# Patient Record
Sex: FEMALE | Race: WHITE | NOT HISPANIC OR LATINO | Employment: UNEMPLOYED | URBAN - METROPOLITAN AREA
[De-identification: names, ages, dates, MRNs, and addresses within clinical notes are randomized per-mention and may not be internally consistent; named-entity substitution may affect disease eponyms.]

---

## 2017-01-16 ENCOUNTER — HOSPITAL ENCOUNTER (EMERGENCY)
Facility: HOSPITAL | Age: 57
Discharge: HOME | End: 2017-01-16
Attending: SURGERY

## 2017-01-16 VITALS
HEART RATE: 101 BPM | WEIGHT: 129 LBS | TEMPERATURE: 100 F | DIASTOLIC BLOOD PRESSURE: 72 MMHG | RESPIRATION RATE: 18 BRPM | SYSTOLIC BLOOD PRESSURE: 134 MMHG | OXYGEN SATURATION: 100 %

## 2017-01-16 DIAGNOSIS — J44.1 CHRONIC OBSTRUCTIVE PULMONARY DISEASE WITH ACUTE EXACERBATION: Primary | ICD-10-CM

## 2017-01-16 LAB
ALBUMIN SERPL BCP-MCNC: 3.9 G/DL
ALP SERPL-CCNC: 97 U/L
ALT SERPL W/O P-5'-P-CCNC: 14 U/L
ANION GAP SERPL CALC-SCNC: 11 MMOL/L
AST SERPL-CCNC: 15 U/L
BASOPHILS # BLD AUTO: 0.07 K/UL
BASOPHILS NFR BLD: 0.7 %
BILIRUB SERPL-MCNC: 0.7 MG/DL
BNP SERPL-MCNC: 44 PG/ML
BUN SERPL-MCNC: 9 MG/DL
CALCIUM SERPL-MCNC: 9 MG/DL
CHLORIDE SERPL-SCNC: 104 MMOL/L
CK MB SERPL-MCNC: 1.4 NG/ML
CK MB SERPL-RTO: 2.8 %
CK SERPL-CCNC: 50 U/L
CK SERPL-CCNC: 50 U/L
CO2 SERPL-SCNC: 26 MMOL/L
CREAT SERPL-MCNC: 0.8 MG/DL
DIFFERENTIAL METHOD: NORMAL
EOSINOPHIL # BLD AUTO: 0.3 K/UL
EOSINOPHIL NFR BLD: 2.8 %
ERYTHROCYTE [DISTWIDTH] IN BLOOD BY AUTOMATED COUNT: 13.2 %
EST. GFR  (AFRICAN AMERICAN): >60 ML/MIN/1.73 M^2
EST. GFR  (NON AFRICAN AMERICAN): >60 ML/MIN/1.73 M^2
GLUCOSE SERPL-MCNC: 146 MG/DL
HCT VFR BLD AUTO: 43 %
HGB BLD-MCNC: 13.8 G/DL
LACTATE SERPL-SCNC: 1.3 MMOL/L
LYMPHOCYTES # BLD AUTO: 3.3 K/UL
LYMPHOCYTES NFR BLD: 31.7 %
MCH RBC QN AUTO: 30.5 PG
MCHC RBC AUTO-ENTMCNC: 32.1 %
MCV RBC AUTO: 95 FL
MONOCYTES # BLD AUTO: 0.9 K/UL
MONOCYTES NFR BLD: 8.9 %
NEUTROPHILS # BLD AUTO: 5.8 K/UL
NEUTROPHILS NFR BLD: 55.9 %
PLATELET # BLD AUTO: 219 K/UL
PMV BLD AUTO: 11.4 FL
POTASSIUM SERPL-SCNC: 4.3 MMOL/L
PROT SERPL-MCNC: 7.5 G/DL
RBC # BLD AUTO: 4.53 M/UL
SODIUM SERPL-SCNC: 141 MMOL/L
TROPONIN I SERPL DL<=0.01 NG/ML-MCNC: <0.006 NG/ML
WBC # BLD AUTO: 10.32 K/UL

## 2017-01-16 PROCEDURE — 83605 ASSAY OF LACTIC ACID: CPT

## 2017-01-16 PROCEDURE — 94640 AIRWAY INHALATION TREATMENT: CPT

## 2017-01-16 PROCEDURE — 87040 BLOOD CULTURE FOR BACTERIA: CPT

## 2017-01-16 PROCEDURE — 96374 THER/PROPH/DIAG INJ IV PUSH: CPT

## 2017-01-16 PROCEDURE — 84484 ASSAY OF TROPONIN QUANT: CPT

## 2017-01-16 PROCEDURE — 99284 EMERGENCY DEPT VISIT MOD MDM: CPT | Mod: 25

## 2017-01-16 PROCEDURE — 82553 CREATINE MB FRACTION: CPT

## 2017-01-16 PROCEDURE — 85025 COMPLETE CBC W/AUTO DIFF WBC: CPT

## 2017-01-16 PROCEDURE — 25000242 PHARM REV CODE 250 ALT 637 W/ HCPCS: Performed by: SURGERY

## 2017-01-16 PROCEDURE — 94644 CONT INHLJ TX 1ST HOUR: CPT

## 2017-01-16 PROCEDURE — 83880 ASSAY OF NATRIURETIC PEPTIDE: CPT

## 2017-01-16 PROCEDURE — 25000003 PHARM REV CODE 250: Performed by: SURGERY

## 2017-01-16 PROCEDURE — 80053 COMPREHEN METABOLIC PANEL: CPT

## 2017-01-16 PROCEDURE — 63600175 PHARM REV CODE 636 W HCPCS: Performed by: SURGERY

## 2017-01-16 RX ORDER — NITROGLYCERIN 0.4 MG/1
0.4 TABLET SUBLINGUAL EVERY 5 MIN PRN
COMMUNITY
End: 2019-12-26 | Stop reason: CLARIF

## 2017-01-16 RX ORDER — LEVALBUTEROL INHALATION SOLUTION 1.25 MG/3ML
1.25 SOLUTION RESPIRATORY (INHALATION)
Status: DISCONTINUED | OUTPATIENT
Start: 2017-01-16 | End: 2017-01-16 | Stop reason: HOSPADM

## 2017-01-16 RX ORDER — ALBUTEROL SULFATE 0.83 MG/ML
2.5 SOLUTION RESPIRATORY (INHALATION) EVERY 6 HOURS PRN
Qty: 1 BOX | Refills: 0 | Status: ON HOLD | OUTPATIENT
Start: 2017-01-16 | End: 2019-12-29 | Stop reason: HOSPADM

## 2017-01-16 RX ORDER — ALBUTEROL SULFATE 1.25 MG/3ML
1.25 SOLUTION RESPIRATORY (INHALATION) EVERY 6 HOURS PRN
COMMUNITY
End: 2017-01-16

## 2017-01-16 RX ORDER — ALBUTEROL SULFATE 2.5 MG/.5ML
10 SOLUTION RESPIRATORY (INHALATION) CONTINUOUS
Status: DISCONTINUED | OUTPATIENT
Start: 2017-01-16 | End: 2017-01-16 | Stop reason: HOSPADM

## 2017-01-16 RX ORDER — METHYLPREDNISOLONE 4 MG/1
TABLET ORAL
Qty: 1 PACKAGE | Refills: 0 | Status: SHIPPED | OUTPATIENT
Start: 2017-01-16 | End: 2018-07-12

## 2017-01-16 RX ORDER — ALBUTEROL SULFATE 90 UG/1
1-2 AEROSOL, METERED RESPIRATORY (INHALATION) EVERY 6 HOURS PRN
Qty: 1 INHALER | Refills: 0 | Status: SHIPPED | OUTPATIENT
Start: 2017-01-16 | End: 2018-01-16

## 2017-01-16 RX ORDER — BUDESONIDE AND FORMOTEROL FUMARATE DIHYDRATE 160; 4.5 UG/1; UG/1
2 AEROSOL RESPIRATORY (INHALATION) EVERY 12 HOURS
COMMUNITY
End: 2019-12-26 | Stop reason: CLARIF

## 2017-01-16 RX ORDER — LEVALBUTEROL 1.25 MG/.5ML
1.25 SOLUTION, CONCENTRATE RESPIRATORY (INHALATION)
Status: DISCONTINUED | OUTPATIENT
Start: 2017-01-16 | End: 2017-01-16 | Stop reason: HOSPADM

## 2017-01-16 RX ORDER — AZITHROMYCIN 250 MG/1
TABLET, FILM COATED ORAL
Qty: 6 TABLET | Refills: 0 | Status: SHIPPED | OUTPATIENT
Start: 2017-01-16 | End: 2018-07-12

## 2017-01-16 RX ORDER — LEVALBUTEROL 1.25 MG/.5ML
1.25 SOLUTION, CONCENTRATE RESPIRATORY (INHALATION)
Status: COMPLETED | OUTPATIENT
Start: 2017-01-16 | End: 2017-01-16

## 2017-01-16 RX ORDER — ALPRAZOLAM 0.5 MG/1
0.25 TABLET ORAL DAILY PRN
COMMUNITY

## 2017-01-16 RX ADMIN — METHYLPREDNISOLONE SODIUM SUCCINATE 125 MG: 125 INJECTION, POWDER, FOR SOLUTION INTRAMUSCULAR; INTRAVENOUS at 02:01

## 2017-01-16 RX ADMIN — LEVALBUTEROL 1.25 MG: 1.25 SOLUTION, CONCENTRATE RESPIRATORY (INHALATION) at 01:01

## 2017-01-16 RX ADMIN — ALBUTEROL SULFATE 10 MG: 2.5 SOLUTION RESPIRATORY (INHALATION) at 02:01

## 2017-01-16 NOTE — ED AVS SNAPSHOT
[facility]  [address] [facility] [address] [address]               Odessa Cm   2017  1:05 PM   ED    Description:  Female : 1960   Department:  [facility]           Your Care was Coordinated By:     Provider Role From To    Louie Macdonald MD Attending Provider 17 0883 --      Reason for Visit     Shortness of Breath           Diagnoses this Visit        Comments    Chronic obstructive pulmonary disease with acute exacerbation    -  Primary       ED Disposition     ED Disposition Condition Comment    Discharge             To Do List           Follow-up Information     Follow up with Angelica Alarcon MD. Schedule an appointment as soon as possible for a visit in 2 days.    Specialty:  Family Medicine    Contact information:    [address]  [facility] [address]  [phone]         These Medications        Disp Refills Start End    albuterol 90 mcg/actuation inhaler 1 Inhaler 0 2017    Inhale 1-2 puffs into the lungs every 6 (six) hours as needed for Wheezing (sob). - Inhalation    albuterol (PROVENTIL) 2.5 mg /3 mL (0.083 %) nebulizer solution 1 Box 0 2017    Take 3 mLs (2.5 mg total) by nebulization every 6 (six) hours as needed for Wheezing. - Nebulization    methylPREDNISolone (MEDROL DOSEPACK) 4 mg tablet 1 Package 0 2017     Pack as directed    azithromycin (Z-EVA) 250 MG tablet 6 tablet 0 2017     Z-PACK AS DIRECTED      [facility]     [facility] -  Assistance  Registered nurses in the [facility] provide clinical advisement, health education, appointment booking, and other advisory services.  Call for this free service at [phone].             Medications           START taking these NEW medications        Refills    albuterol 90 mcg/actuation inhaler 0    Sig: Inhale 1-2 puffs into the lungs every 6 (six) hours as needed for Wheezing  (sob).    Class: Print    Route: Inhalation    albuterol (PROVENTIL) 2.5 mg /3 mL (0.083 %) nebulizer solution 0    Sig: Take 3 mLs (2.5 mg total) by nebulization every 6 (six) hours as needed for Wheezing.    Class: Print    Route: Nebulization    methylPREDNISolone (MEDROL DOSEPACK) 4 mg tablet 0    Sig: Pack as directed    Class: Print    azithromycin (Z-EVA) 250 MG tablet 0    Sig: Z-PACK AS DIRECTED    Class: Print      These medications were administered today        Dose Freq    levalbuterol nebulizer solution 1.25 mg 1.25 mg ED 1 Time    Sig: Take 0.5 mLs (1.25 mg total) by nebulization ED 1 Time.    Class: Normal    Route: Nebulization    levalbuterol nebulizer solution 1.25 mg 1.25 mg ED 1 Time    Sig: Take 3 mLs (1.25 mg total) by nebulization ED 1 Time.    Class: Normal    Route: Nebulization    levalbuterol nebulizer solution 1.25 mg 1.25 mg ED 1 Time    Sig: Take 0.5 mLs (1.25 mg total) by nebulization ED 1 Time.    Class: Normal    Route: Nebulization    albuterol sulfate nebulizer solution 10 mg 10 mg Continuous    Sig: Take 10 mg by nebulization continuous.    Class: Normal    Route: Nebulization    methylPREDNISolone sod suc(PF) 125 mg/2 mL injection 125 mg 125 mg ED 1 Time    Sig: Inject 125 mg into the vein ED 1 Time.    Class: Normal    Route: Intravenous      STOP taking these medications     albuterol (ACCUNEB) 1.25 mg/3 mL Nebu Take 1.25 mg by nebulization every 6 (six) hours as needed.           Verify that the below list of medications is an accurate representation of the medications you are currently taking.  If none reported, the list may be blank. If incorrect, please contact your healthcare provider. Carry this list with you in case of emergency.           Current Medications     alprazolam (XANAX) 0.5 MG tablet Take 0.25 mg by mouth daily as needed for Anxiety.    budesonide-formoterol 160-4.5 mcg (SYMBICORT) 160-4.5 mcg/actuation HFAA Inhale 2 puffs into the lungs every 12 (twelve)  hours.    albuterol (PROVENTIL) 2.5 mg /3 mL (0.083 %) nebulizer solution Take 3 mLs (2.5 mg total) by nebulization every 6 (six) hours as needed for Wheezing.    albuterol 90 mcg/actuation inhaler Inhale 1-2 puffs into the lungs every 6 (six) hours as needed for Wheezing (sob).    albuterol sulfate nebulizer solution 10 mg Take 10 mg by nebulization continuous.    azithromycin (Z-EVA) 250 MG tablet Z-PACK AS DIRECTED    levalbuterol nebulizer solution 1.25 mg Take 3 mLs (1.25 mg total) by nebulization ED 1 Time.    levalbuterol nebulizer solution 1.25 mg Take 0.5 mLs (1.25 mg total) by nebulization ED 1 Time.    methylPREDNISolone (MEDROL DOSEPACK) 4 mg tablet Pack as directed    nitroGLYCERIN (NITROSTAT) 0.4 MG SL tablet Place 0.4 mg under the tongue every 5 (five) minutes as needed for Chest pain.           Clinical Reference Information           Your Vitals Were     BP Pulse Temp Resp Weight SpO2    130/61 109 99.6 °F (37.6 °C) (Oral) 20 58.5 kg (129 lb) 100%      Allergies as of 1/16/2017        Reactions    Codeine       Immunizations Administered on Date of Encounter - 1/16/2017     None      ED Micro, Lab, POCT     Start Ordered       Status Ordering Provider    01/16/17 1308 01/16/17 1307  Comprehensive metabolic panel  STAT      Final result     01/16/17 1308 01/16/17 1307  CBC auto differential  STAT      Final result     01/16/17 1308 01/16/17 1307  Urinalysis  STAT      Acknowledged     01/16/17 1308 01/16/17 1307  Blood culture  Once      In process     01/16/17 1308 01/16/17 1307  Blood culture  Once      In process     01/16/17 1308 01/16/17 1307  Lactic acid, plasma  STAT      Final result     01/16/17 1308 01/16/17 1307  Troponin I  Now then every 6 hours     Start Status   01/16/17 1308 Final result   01/16/17 1908 Scheduled   01/17/17 0108 Scheduled   01/17/17 0708 Scheduled   01/17/17 1308 Scheduled   01/17/17 1908 Scheduled   01/18/17 0108 Scheduled   01/18/17 0708 Scheduled   01/18/17 1308  Scheduled   01/18/17 1908 Scheduled   01/19/17 0108 Scheduled   01/19/17 0708 Scheduled   01/19/17 1308 Scheduled   01/19/17 1908 Scheduled       Acknowledged     01/16/17 1308 01/16/17 1307  CK  STAT      Final result     01/16/17 1308 01/16/17 1307  CK-MB  STAT      Final result     01/16/17 1308 01/16/17 1307  Brain natriuretic peptide  STAT      Final result       ED Imaging Orders     Start Ordered       Status Ordering Provider    01/16/17 1308 01/16/17 1307  X-Ray Chest PA And Lateral  1 time imaging      Final result       Discharge References/Attachments     COPD FLARE (ENGLISH)      [facility] Sign-Up     Activating your [facility] account is as easy as 1-2-3!     1) Visit [email or web address], select Sign Up Now, enter this activation code and your date of birth, then select Next.  [email or web address]  Expires: 3/2/2017  2:22 PM      2) Create a username and password to use when you visit [facility] in the future and select a security question in case you lose your password and select Next.    3) Enter your e-mail address and click Sign Up!    Additional Information  If you have questions, please e-mail [email or web address] or call [phone] to talk to our [facility] staff. Remember, [facility] is NOT to be used for urgent needs. For medical emergencies, dial 911.         Smoking Cessation     If you would like to quit smoking:   You may be eligible for free services if you are a [place] resident and started smoking cigarettes before September 1, 1988.  Call the Smoking Cessation Trust (SCT) toll free at (458) 062-3547 or (808) 828-7875.   Call 6-800-QUIT-NOW if you do not meet the above criteria.             [facility] complies with applicable Federal civil rights laws and does not discriminate on the basis of race, color, national origin, age, disability, or sex.        Language Assistance Services     ATTENTION: Language assistance services are available, free of charge. Please call  [phone].      ATENCIÓN: Si habla español, tiene a hawk disposición servicios gratuitos de asistencia lingüística. Llame al [phone].     CHÚ Ý: N?u b?n nói Ti?ng Vi?t, có các d?ch v? h? tr? ngôn ng? mi?n phí dành cho b?n. G?i s? [phone].

## 2017-01-16 NOTE — ED NOTES
"Patient states, "I'm feeling much better,"  Patient denies SOB.  Discharged to home in stable condition, respirations even and unlabored, NAD.   "

## 2017-01-16 NOTE — ED NOTES
" states, "The patient's IV fell out."  IV noted to be on floor, catheter intact, dressing applied to site.   "

## 2017-01-16 NOTE — ED PROVIDER NOTES
[facility]                                        2017                     Chief Complaint  56 y.o. female with Shortness of Breath    History of Present Illness  [place] Cm presents to the emergency room with shortness of breath and cough  Patient states she was driving along when she had a COPD exacerbation prior to arrival  Patient was a long-time smoker that quit last year, has a long COPD history per daughter  Patient initially arrives with faint crackles but no wheezing, good air exchange on arrival  Patient was 100% oxygenation after first breathing treatment, wears 2 liters at home    Pt denies any sputum production, has a dry hacking cough that has been worse ×3 days  Patient feels much better after breathing treatments in the ER, no longer short of breath     The history is provided by the patient    Past Medical History   -- Anxiety    -- COPD (chronic obstructive pulmonary disease)    -- Coronary artery disease    -- Hypertension      Past Surgical History   -- Coronary angioplasty with stent placement     -- Appendectomy     -- Cholecystectomy     -- Hysterectomy     --  section     -- Carpal tunnel release        ALLERGIES: Codeine     Review of Systems and Physical Exam     Review of Systems  -- Constitution - no fever, denies fatigue, no weakness, no chills  -- Eyes - no tearing or redness, no visual disturbance  -- Ear, Nose - no tinnitus or earache, no nasal congestion or discharge  -- Mouth,Throat - no sore throat, no toothache, normal voice, normal swallowing  -- Respiratory - cough and congestion, shortness of breath, no PORTER  -- Cardiovascular - denies chest pain, no palpitations, denies claudication  -- Gastrointestinal - denies abdominal pain, nausea, vomiting, or diarrhea  -- Musculoskeletal - denies back pain, negative for myalgias and arthralgias   -- Neurological - no headache, denies weakness or seizure; no LOC  -- Skin - denies pallor,  rash, or changes in skin. no hives or welts noted    Vital Signs  -- BP is 130/61 and her pulse is 109. Her respiration is 20 and oxygen saturation is 100%.      Physical Exam  -- Nursing note and vitals reviewed  -- Constitutional: Appears well-developed and well-nourished  -- Head: Atraumatic. Normocephalic. No obvious abnormality  -- Eyes: Pupils are equal and reactive to light. Normal conjunctiva and lids  -- Nose: Nose normal in appearance, nares grossly normal. No discharge  -- Throat: Mucous membranes moist, pharynx normal, normal tonsils. No lesions   -- Ears: External ears and TM normal bilaterally. Normal hearing and no drainage  -- Cardiac: Normal rate, regular rhythm and normal heart sounds  -- Pulmonary: faint rhonchi at the bilateral bases with no active wheezing   -- Abdominal: Soft, no tenderness. Normal bowel sounds. Normal liver edge  -- Musculoskeletal: Normal range of motion, no effusions. Joints stable   -- Neurological: No focal deficits. Showed good interaction with staff    Emergency Room Course     Treatment and Evaluation  -- The EKG findings today were without concerning findings from baseline   -- Chest x-ray showed no infiltrate and showed no acute pathology   -- The electrolytes drawn in the ER today were within normal limits   -- The CBC drawn in the ER today was within normal limits   -- The cardiac enzymes were within normal limits   -- The BNP drawn in the ER today were within normal limits   -- Lactic Acid drawn in the ER today was normal   -- Blood cultures have also been drawn, results are pending     Medications Given  -- levalbuterol nebulizer solution 1.25 mg (1.25 mg Nebulization Not Given 1/16/17 1330)   -- levalbuterol nebulizer solution 1.25 mg (1.25 mg Nebulization Not Given 1/16/17 1330)   -- albuterol sulfate nebulizer solution 10 mg ( Nebulization Canceled Entry 1/16/17 3499)   -- levalbuterol nebulizer solution 1.25 mg (1.25 mg Nebulization Given 1/16/17 1330)   --  methylPREDNISolone sod suc(PF) 125 mg/2 mL injection 125 mg      Diagnosis  -- The encounter diagnosis was Chronic obstructive pulmonary disease with acute exacerbation.    Disposition and Plan  -- Disposition: home  -- Condition: stable  -- Follow-up: Patient to follow up with as MD in 1-2 days.  -- I advised the patient that we have found no life threatening condition today  -- At this time, I believe the patient is clinically stable for discharge.   -- The patient acknowledges that close follow up with a MD is required   -- Patient agrees to comply with all instruction and direction given in the ER    This note is dictated on Dragon Natural Speaking word recognition program.  There are word recognition mistakes that are occasionally missed on review.           Louie Macdonald MD  01/16/17 3087

## 2017-01-22 LAB
BACTERIA BLD CULT: NORMAL
BACTERIA BLD CULT: NORMAL

## 2018-07-12 ENCOUNTER — HOSPITAL ENCOUNTER (EMERGENCY)
Facility: HOSPITAL | Age: 58
Discharge: HOME | End: 2018-07-12
Payer: COMMERCIAL

## 2018-07-12 VITALS
DIASTOLIC BLOOD PRESSURE: 82 MMHG | HEIGHT: 62 IN | RESPIRATION RATE: 20 BRPM | HEART RATE: 98 BPM | OXYGEN SATURATION: 94 % | WEIGHT: 138 LBS | TEMPERATURE: 98 F | SYSTOLIC BLOOD PRESSURE: 149 MMHG | BODY MASS INDEX: 25.4 KG/M2
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
## 2018-07-12 PROBLEM — M81.0 OSTEOPOROSIS: Status: ACTIVE | Noted: 2018-07-12

## 2018-07-12 PROBLEM — J44.9 CHRONIC OBSTRUCTIVE LUNG DISEASE: Status: ACTIVE | Noted: 2018-07-12

## 2018-07-12 PROBLEM — I25.10 CORONARY ATHEROSCLEROSIS: Status: ACTIVE | Noted: 2018-07-12

## 2018-07-12 PROBLEM — E55.9 VITAMIN D DEFICIENCY: Status: ACTIVE | Noted: 2018-07-12

## 2018-07-12 PROBLEM — F41.9 ANXIETY: Status: ACTIVE | Noted: 2018-07-12

## 2018-07-12 PROCEDURE — 99283 EMERGENCY DEPT VISIT LOW MDM: CPT

## 2018-07-12 RX ORDER — TIOTROPIUM BROMIDE 18 UG/1
CAPSULE ORAL; RESPIRATORY (INHALATION)
COMMUNITY
End: 2019-12-26 | Stop reason: CLARIF

## 2018-07-12 RX ORDER — MUPIROCIN 20 MG/G
OINTMENT TOPICAL 3 TIMES DAILY
Qty: 22 G | Refills: 0 | Status: SHIPPED | OUTPATIENT
Start: 2018-07-12 | End: 2019-12-26 | Stop reason: CLARIF

## 2018-07-12 RX ORDER — SULFAMETHOXAZOLE AND TRIMETHOPRIM 800; 160 MG/1; MG/1
1 TABLET ORAL 2 TIMES DAILY
Qty: 14 TABLET | Refills: 0 | Status: SHIPPED | OUTPATIENT
Start: 2018-07-12 | End: 2018-07-19

## 2018-07-12 RX ORDER — ZOLEDRONIC ACID 5 MG/100ML
100 INJECTION, SOLUTION INTRAVENOUS
COMMUNITY
End: 2019-12-26 | Stop reason: CLARIF

## 2018-07-12 RX ORDER — IPRATROPIUM BROMIDE 0.5 MG/2.5ML
SOLUTION RESPIRATORY (INHALATION)
Status: ON HOLD | COMMUNITY
End: 2019-12-29 | Stop reason: HOSPADM

## 2018-07-13 NOTE — ED PROVIDER NOTES
CHIEF COMPLAINT  Chief Complaint   Patient presents with    Insect Bite     right arm       HPI  Od[place] Jj a 57 y.o. female who presents to the ED with complaints of an insect bite to her right arm. Did not see what bit her. Noticed it when she awakened today. Has not put any topical medication or taken anything for the problem.       CURRENT MEDICATIONS  No current facility-administered medications on file prior to encounter.      Current Outpatient Prescriptions on File Prior to Encounter   Medication Sig Dispense Refill    albuterol (PROVENTIL) 2.5 mg /3 mL (0.083 %) nebulizer solution Take 3 mLs (2.5 mg total) by nebulization every 6 (six) hours as needed for Wheezing. 1 Box 0    alprazolam (XANAX) 0.5 MG tablet Take 0.25 mg by mouth daily as needed for Anxiety.      budesonide-formoterol 160-4.5 mcg (SYMBICORT) 160-4.5 mcg/actuation HFAA Inhale 2 puffs into the lungs every 12 (twelve) hours.      nitroGLYCERIN (NITROSTAT) 0.4 MG SL tablet Place 0.4 mg under the tongue every 5 (five) minutes as needed for Chest pain.      [DISCONTINUED] azithromycin (Z-EVA) 250 MG tablet Z-PACK AS DIRECTED 6 tablet 0    [DISCONTINUED] methylPREDNISolone (MEDROL DOSEPACK) 4 mg tablet Pack as directed 1 Package 0       ALLERGIES  Review of patient's allergies indicates:   Allergen Reactions    Codeine     Pcn [penicillins]          There is no immunization history on file for this patient.    PAST MEDICAL HISTORY  Past Medical History:   Diagnosis Date    Anxiety     COPD (chronic obstructive pulmonary disease)     Coronary artery disease     Hypertension        SURGICAL HISTORY  Past Surgical History:   Procedure Laterality Date    APPENDECTOMY      CARPAL TUNNEL RELEASE Left      SECTION      CHOLECYSTECTOMY      CORONARY ANGIOPLASTY WITH STENT PLACEMENT      HYSTERECTOMY         SOCIAL HISTORY  Social History     Social History    Marital status: Single     Spouse name: N/A    Number of  "children: N/A    Years of education: N/A     Occupational History    Not on file.     Social History Main Topics    Smoking status: Former Smoker    Smokeless tobacco: Not on file    Alcohol use No    Drug use: No    Sexual activity: No     Other Topics Concern    Not on file     Social History Narrative    No narrative on file       FAMILY HISTORY  History reviewed. No pertinent family history.    REVIEW OF SYSTEMS  Constitutional: No fever, chills, or weakness.  Eyes: No redness, pain, or discharge  HENT: No ear pain, no headache, no rhinorrhea, no throat pain  Respiratory: No cough, wheezing or shortness of breath  Cardiovascular: No chest pain, palpitations or edema  GI: No abdominal pain, nausea, vomiting or diarrhea  Gu: No dysuria, no hematuria, or discharge  Musculoskeletal: No pain, full range of motion. Good sensation  Skin: Insect bite right arm  Neurologic: No focal weakness or sensory changes.  All systems otherwise negative except as noted in the Review of Systems and History of Present Illness      PHYSICAL EXAM  Reviewed Triage Note  VITAL SIGNS:   Patient Vitals for the past 24 hrs:   BP Temp Temp src Pulse Resp SpO2 Height Weight   07/12/18 1911 (!) 149/82 98.3 °F (36.8 °C) Oral 98 20 (!) 94 % 5' 2" (1.575 m) 62.6 kg (138 lb)     Constitutional: Well developed, well nourished, Alert and oriented x3, No acute distress, non-toxic appearance.  HENT: Normocephalic, Atraumatic, Bilateral external ears normal, external nose negative, oropharynx moist, No oral exudates.  Eyes: PERRL, EOMI, Conjunctiva normal, No discharge.  Neck: Normal range of motion, no tenderness, supple, no carotid bruits  Respiratory: Normal breath sounds, no respiratory distress, no wheezing, no rhonchi, no rales  Cardiovascular: Normal heart rate, normal rhythm, no murmurs, no rubs, no gallops.  Gi: Bowel sounds normal, soft, no tenderness, non-distended, no masses, no pulsatile masses.  Musculoskeletal: No edema, no " tenderness, no cyanosis, no clubbing. Good range of motion in all major joints. No tenderness to palpation or major deformities noted.   Integument: red, inflamed area right distal upper arm near proximal to elbow. Not tender to touch. Area warm to touch. Scabbed center.   Neurologic: Normal motor function, normal sensory function. No focal deficits noted. Intact distal pulses  Psychiatric: Affect normal, judgment normal, mood normal      LABS  Pertinent labs reviewed. (see chart for details)  Labs Reviewed - No data to display    RADIOLOGY  No orders to display         PROCEDURE  Procedures      ED COURSE & MEDICAL DECISION MAKING     MDM       Physical exam findings discussed with patient. No acute emergent medical condition identified at this time to warrant further testing. Will dispo home with instructions to follow up with PCP tomorrow, return to the ED for worsening condition. Pt agrees with plan of care.     DISPOSITION  Patient discharged in stable condition 7/12/2018  7:17 PM      CLINICAL IMPRESSION:  The encounter diagnosis was Insect bite, initial encounter.    Patient advised to follow-up with your PCP within 3 days for BP re-check if Blood Pressure was >120/80 without history of hypertension.         CLEMENTE العراقي  07/12/18 4292

## 2019-12-26 ENCOUNTER — HOSPITAL ENCOUNTER (OUTPATIENT)
Facility: HOSPITAL | Age: 59
Discharge: HOME | End: 2019-12-30
Attending: EMERGENCY MEDICINE | Admitting: INTERNAL MEDICINE
Payer: MEDICARE

## 2019-12-26 DIAGNOSIS — R06.02 SOB (SHORTNESS OF BREATH): ICD-10-CM

## 2019-12-26 DIAGNOSIS — J44.1 COPD EXACERBATION: ICD-10-CM

## 2019-12-26 LAB
ALBUMIN SERPL BCP-MCNC: 3.2 G/DL (ref 3.5–5.2)
ALP SERPL-CCNC: 66 U/L (ref 55–135)
ALT SERPL W/O P-5'-P-CCNC: 10 U/L (ref 10–44)
ANION GAP SERPL CALC-SCNC: 6 MMOL/L (ref 8–16)
AST SERPL-CCNC: 15 U/L (ref 10–40)
BASOPHILS # BLD AUTO: 0.03 K/UL (ref 0–0.2)
BASOPHILS NFR BLD: 0.3 % (ref 0–1.9)
BILIRUB SERPL-MCNC: 0.4 MG/DL (ref 0.1–1)
BNP SERPL-MCNC: 58 PG/ML (ref 0–99)
BUN SERPL-MCNC: 13 MG/DL (ref 6–20)
CALCIUM SERPL-MCNC: 8 MG/DL (ref 8.7–10.5)
CHLORIDE SERPL-SCNC: 103 MMOL/L (ref 95–110)
CO2 SERPL-SCNC: 32 MMOL/L (ref 23–29)
CREAT SERPL-MCNC: 0.7 MG/DL (ref 0.5–1.4)
DIFFERENTIAL METHOD: ABNORMAL
EOSINOPHIL # BLD AUTO: 0.1 K/UL (ref 0–0.5)
EOSINOPHIL NFR BLD: 0.8 % (ref 0–8)
ERYTHROCYTE [DISTWIDTH] IN BLOOD BY AUTOMATED COUNT: 12.4 % (ref 11.5–14.5)
EST. GFR  (AFRICAN AMERICAN): >60 ML/MIN/1.73 M^2
EST. GFR  (NON AFRICAN AMERICAN): >60 ML/MIN/1.73 M^2
GLUCOSE SERPL-MCNC: 129 MG/DL (ref 70–110)
HCT VFR BLD AUTO: 36.2 % (ref 37–48.5)
HGB BLD-MCNC: 11 G/DL (ref 12–16)
IMM GRANULOCYTES # BLD AUTO: 0.02 K/UL (ref 0–0.04)
IMM GRANULOCYTES NFR BLD AUTO: 0.2 % (ref 0–0.5)
LYMPHOCYTES # BLD AUTO: 0.9 K/UL (ref 1–4.8)
LYMPHOCYTES NFR BLD: 9.7 % (ref 18–48)
MAGNESIUM SERPL-MCNC: 1.9 MG/DL (ref 1.6–2.6)
MCH RBC QN AUTO: 29.3 PG (ref 27–31)
MCHC RBC AUTO-ENTMCNC: 30.4 G/DL (ref 32–36)
MCV RBC AUTO: 96 FL (ref 82–98)
MONOCYTES # BLD AUTO: 0.4 K/UL (ref 0.3–1)
MONOCYTES NFR BLD: 4.4 % (ref 4–15)
NEUTROPHILS # BLD AUTO: 7.6 K/UL (ref 1.8–7.7)
NEUTROPHILS NFR BLD: 84.6 % (ref 38–73)
NRBC BLD-RTO: 0 /100 WBC
PLATELET # BLD AUTO: 216 K/UL (ref 150–350)
PMV BLD AUTO: 10.6 FL (ref 9.2–12.9)
POTASSIUM SERPL-SCNC: 3.5 MMOL/L (ref 3.5–5.1)
PROT SERPL-MCNC: 7.5 G/DL (ref 6–8.4)
RBC # BLD AUTO: 3.76 M/UL (ref 4–5.4)
SODIUM SERPL-SCNC: 141 MMOL/L (ref 136–145)
TROPONIN I SERPL DL<=0.01 NG/ML-MCNC: 0.01 NG/ML (ref 0.02–0.5)
WBC # BLD AUTO: 8.96 K/UL (ref 3.9–12.7)

## 2019-12-26 PROCEDURE — 93005 ELECTROCARDIOGRAM TRACING: CPT

## 2019-12-26 PROCEDURE — 85025 COMPLETE CBC W/AUTO DIFF WBC: CPT

## 2019-12-26 PROCEDURE — 84484 ASSAY OF TROPONIN QUANT: CPT

## 2019-12-26 PROCEDURE — 84100 ASSAY OF PHOSPHORUS: CPT

## 2019-12-26 PROCEDURE — 71046 X-RAY EXAM CHEST 2 VIEWS: CPT | Mod: TC,FY

## 2019-12-26 PROCEDURE — 83735 ASSAY OF MAGNESIUM: CPT

## 2019-12-26 PROCEDURE — 80053 COMPREHEN METABOLIC PANEL: CPT

## 2019-12-26 PROCEDURE — 71046 X-RAY EXAM CHEST 2 VIEWS: CPT | Mod: 26,,, | Performed by: RADIOLOGY

## 2019-12-26 PROCEDURE — 83880 ASSAY OF NATRIURETIC PEPTIDE: CPT

## 2019-12-26 PROCEDURE — 99285 EMERGENCY DEPT VISIT HI MDM: CPT | Mod: 25

## 2019-12-26 PROCEDURE — 25000242 PHARM REV CODE 250 ALT 637 W/ HCPCS: Performed by: EMERGENCY MEDICINE

## 2019-12-26 PROCEDURE — 94761 N-INVAS EAR/PLS OXIMETRY MLT: CPT

## 2019-12-26 PROCEDURE — 71046 XR CHEST PA AND LATERAL: ICD-10-PCS | Mod: 26,,, | Performed by: RADIOLOGY

## 2019-12-26 PROCEDURE — 94640 AIRWAY INHALATION TREATMENT: CPT

## 2019-12-26 RX ORDER — IPRATROPIUM BROMIDE AND ALBUTEROL SULFATE 2.5; .5 MG/3ML; MG/3ML
3 SOLUTION RESPIRATORY (INHALATION)
Status: COMPLETED | OUTPATIENT
Start: 2019-12-26 | End: 2019-12-26

## 2019-12-26 RX ADMIN — IPRATROPIUM BROMIDE AND ALBUTEROL SULFATE 3 ML: .5; 3 SOLUTION RESPIRATORY (INHALATION) at 08:12

## 2019-12-27 PROBLEM — R06.02 SOB (SHORTNESS OF BREATH): Status: ACTIVE | Noted: 2019-12-27

## 2019-12-27 PROBLEM — J44.1 COPD EXACERBATION: Status: ACTIVE | Noted: 2019-12-27

## 2019-12-27 LAB
ANION GAP SERPL CALC-SCNC: 7 MMOL/L (ref 8–16)
BASOPHILS # BLD AUTO: 0 K/UL (ref 0–0.2)
BASOPHILS NFR BLD: 0 % (ref 0–1.9)
BILIRUB UR QL STRIP: NEGATIVE
BUN SERPL-MCNC: 13 MG/DL (ref 6–20)
CALCIUM SERPL-MCNC: 8.1 MG/DL (ref 8.7–10.5)
CHLORIDE SERPL-SCNC: 101 MMOL/L (ref 95–110)
CLARITY UR: CLEAR
CO2 SERPL-SCNC: 32 MMOL/L (ref 23–29)
COLOR UR: YELLOW
CREAT SERPL-MCNC: 0.7 MG/DL (ref 0.5–1.4)
DIFFERENTIAL METHOD: ABNORMAL
EOSINOPHIL # BLD AUTO: 0 K/UL (ref 0–0.5)
EOSINOPHIL NFR BLD: 0 % (ref 0–8)
ERYTHROCYTE [DISTWIDTH] IN BLOOD BY AUTOMATED COUNT: 12.4 % (ref 11.5–14.5)
EST. GFR  (AFRICAN AMERICAN): >60 ML/MIN/1.73 M^2
EST. GFR  (NON AFRICAN AMERICAN): >60 ML/MIN/1.73 M^2
GLUCOSE SERPL-MCNC: 177 MG/DL (ref 70–110)
GLUCOSE UR QL STRIP: NEGATIVE
HCT VFR BLD AUTO: 34.1 % (ref 37–48.5)
HGB BLD-MCNC: 10.4 G/DL (ref 12–16)
HGB UR QL STRIP: NEGATIVE
IMM GRANULOCYTES # BLD AUTO: 0.01 K/UL (ref 0–0.04)
IMM GRANULOCYTES NFR BLD AUTO: 0.1 % (ref 0–0.5)
KETONES UR QL STRIP: NEGATIVE
LEUKOCYTE ESTERASE UR QL STRIP: NEGATIVE
LYMPHOCYTES # BLD AUTO: 0.3 K/UL (ref 1–4.8)
LYMPHOCYTES NFR BLD: 5 % (ref 18–48)
MAGNESIUM SERPL-MCNC: 2.1 MG/DL (ref 1.6–2.6)
MCH RBC QN AUTO: 29.1 PG (ref 27–31)
MCHC RBC AUTO-ENTMCNC: 30.5 G/DL (ref 32–36)
MCV RBC AUTO: 96 FL (ref 82–98)
MONOCYTES # BLD AUTO: 0 K/UL (ref 0.3–1)
MONOCYTES NFR BLD: 0.4 % (ref 4–15)
NEUTROPHILS # BLD AUTO: 6.4 K/UL (ref 1.8–7.7)
NEUTROPHILS NFR BLD: 94.5 % (ref 38–73)
NITRITE UR QL STRIP: NEGATIVE
NRBC BLD-RTO: 0 /100 WBC
PH UR STRIP: 6 [PH] (ref 5–8)
PHOSPHATE SERPL-MCNC: 2.4 MG/DL (ref 2.7–4.5)
PHOSPHATE SERPL-MCNC: 2.7 MG/DL (ref 2.7–4.5)
PLATELET # BLD AUTO: 228 K/UL (ref 150–350)
PMV BLD AUTO: 10.7 FL (ref 9.2–12.9)
POTASSIUM SERPL-SCNC: 3.9 MMOL/L (ref 3.5–5.1)
PROT UR QL STRIP: NEGATIVE
RBC # BLD AUTO: 3.57 M/UL (ref 4–5.4)
SODIUM SERPL-SCNC: 140 MMOL/L (ref 136–145)
SP GR UR STRIP: 1.01 (ref 1–1.03)
URN SPEC COLLECT METH UR: NORMAL
UROBILINOGEN UR STRIP-ACNC: 1 EU/DL
WBC # BLD AUTO: 6.74 K/UL (ref 3.9–12.7)

## 2019-12-27 PROCEDURE — 80048 BASIC METABOLIC PNL TOTAL CA: CPT

## 2019-12-27 PROCEDURE — 85025 COMPLETE CBC W/AUTO DIFF WBC: CPT

## 2019-12-27 PROCEDURE — 63600175 PHARM REV CODE 636 W HCPCS: Performed by: HOSPITALIST

## 2019-12-27 PROCEDURE — 94640 AIRWAY INHALATION TREATMENT: CPT

## 2019-12-27 PROCEDURE — 84100 ASSAY OF PHOSPHORUS: CPT

## 2019-12-27 PROCEDURE — 27100107 HC POCKET PEAK FLOW METER

## 2019-12-27 PROCEDURE — 63600175 PHARM REV CODE 636 W HCPCS

## 2019-12-27 PROCEDURE — 25000003 PHARM REV CODE 250

## 2019-12-27 PROCEDURE — 25000242 PHARM REV CODE 250 ALT 637 W/ HCPCS: Performed by: HOSPITALIST

## 2019-12-27 PROCEDURE — 81003 URINALYSIS AUTO W/O SCOPE: CPT

## 2019-12-27 PROCEDURE — 25000242 PHARM REV CODE 250 ALT 637 W/ HCPCS

## 2019-12-27 PROCEDURE — 83735 ASSAY OF MAGNESIUM: CPT

## 2019-12-27 PROCEDURE — 99220 PR INITIAL OBSERVATION CARE,LEVL III: ICD-10-PCS | Mod: ,,, | Performed by: INTERNAL MEDICINE

## 2019-12-27 PROCEDURE — 25000003 PHARM REV CODE 250: Performed by: EMERGENCY MEDICINE

## 2019-12-27 PROCEDURE — G0378 HOSPITAL OBSERVATION PER HR: HCPCS

## 2019-12-27 PROCEDURE — 94761 N-INVAS EAR/PLS OXIMETRY MLT: CPT

## 2019-12-27 PROCEDURE — 99220 PR INITIAL OBSERVATION CARE,LEVL III: CPT | Mod: ,,, | Performed by: INTERNAL MEDICINE

## 2019-12-27 PROCEDURE — 25000003 PHARM REV CODE 250: Performed by: INTERNAL MEDICINE

## 2019-12-27 PROCEDURE — 36415 COLL VENOUS BLD VENIPUNCTURE: CPT

## 2019-12-27 PROCEDURE — 25000003 PHARM REV CODE 250: Performed by: HOSPITALIST

## 2019-12-27 RX ORDER — METHYLPREDNISOLONE SOD SUCC 125 MG
VIAL (EA) INJECTION
Status: COMPLETED
Start: 2019-12-27 | End: 2019-12-27

## 2019-12-27 RX ORDER — POTASSIUM CHLORIDE 20 MEQ/1
TABLET, EXTENDED RELEASE ORAL
Status: COMPLETED
Start: 2019-12-27 | End: 2019-12-27

## 2019-12-27 RX ORDER — SODIUM CHLORIDE 0.9 % (FLUSH) 0.9 %
10 SYRINGE (ML) INJECTION
Status: DISCONTINUED | OUTPATIENT
Start: 2019-12-27 | End: 2019-12-30 | Stop reason: HOSPADM

## 2019-12-27 RX ORDER — ALPRAZOLAM 0.25 MG/1
0.25 TABLET ORAL DAILY PRN
Status: DISCONTINUED | OUTPATIENT
Start: 2019-12-27 | End: 2019-12-30 | Stop reason: HOSPADM

## 2019-12-27 RX ORDER — DOXYCYCLINE HYCLATE 100 MG
100 TABLET ORAL
Status: COMPLETED | OUTPATIENT
Start: 2019-12-27 | End: 2019-12-27

## 2019-12-27 RX ORDER — DOXYCYCLINE 100 MG/1
100 CAPSULE ORAL 2 TIMES DAILY
Qty: 20 CAPSULE | Refills: 0 | Status: SHIPPED | OUTPATIENT
Start: 2019-12-27 | End: 2019-12-29 | Stop reason: HOSPADM

## 2019-12-27 RX ORDER — LEVOFLOXACIN 250 MG/1
750 TABLET ORAL DAILY
Status: DISCONTINUED | OUTPATIENT
Start: 2019-12-27 | End: 2019-12-30 | Stop reason: HOSPADM

## 2019-12-27 RX ORDER — ACETAMINOPHEN 325 MG/1
650 TABLET ORAL EVERY 4 HOURS PRN
Status: DISCONTINUED | OUTPATIENT
Start: 2019-12-27 | End: 2019-12-30 | Stop reason: HOSPADM

## 2019-12-27 RX ORDER — METHYLPREDNISOLONE SOD SUCC 125 MG
125 VIAL (EA) INJECTION
Status: ACTIVE | OUTPATIENT
Start: 2019-12-27 | End: 2019-12-27

## 2019-12-27 RX ORDER — ONDANSETRON 4 MG/1
4 TABLET, ORALLY DISINTEGRATING ORAL EVERY 6 HOURS PRN
Status: DISCONTINUED | OUTPATIENT
Start: 2019-12-27 | End: 2019-12-30 | Stop reason: HOSPADM

## 2019-12-27 RX ORDER — IPRATROPIUM BROMIDE AND ALBUTEROL SULFATE 2.5; .5 MG/3ML; MG/3ML
3 SOLUTION RESPIRATORY (INHALATION) EVERY 4 HOURS PRN
Status: DISCONTINUED | OUTPATIENT
Start: 2019-12-27 | End: 2019-12-30 | Stop reason: HOSPADM

## 2019-12-27 RX ORDER — SODIUM,POTASSIUM PHOSPHATES 280-250MG
1 POWDER IN PACKET (EA) ORAL
Status: COMPLETED | OUTPATIENT
Start: 2019-12-27 | End: 2019-12-27

## 2019-12-27 RX ORDER — FLUTICASONE FUROATE AND VILANTEROL 100; 25 UG/1; UG/1
1 POWDER RESPIRATORY (INHALATION) DAILY
Status: DISCONTINUED | OUTPATIENT
Start: 2019-12-27 | End: 2019-12-30 | Stop reason: HOSPADM

## 2019-12-27 RX ORDER — IPRATROPIUM BROMIDE AND ALBUTEROL SULFATE 2.5; .5 MG/3ML; MG/3ML
SOLUTION RESPIRATORY (INHALATION)
Status: COMPLETED
Start: 2019-12-27 | End: 2019-12-27

## 2019-12-27 RX ORDER — POTASSIUM CHLORIDE 20 MEQ/1
40 TABLET, EXTENDED RELEASE ORAL ONCE
Status: COMPLETED | OUTPATIENT
Start: 2019-12-27 | End: 2019-12-27

## 2019-12-27 RX ADMIN — ALPRAZOLAM 0.25 MG: 0.25 TABLET ORAL at 09:12

## 2019-12-27 RX ADMIN — ACETAMINOPHEN 650 MG: 325 TABLET ORAL at 09:12

## 2019-12-27 RX ADMIN — METHYLPREDNISOLONE SODIUM SUCCINATE 80 MG: 40 INJECTION, POWDER, FOR SOLUTION INTRAMUSCULAR; INTRAVENOUS at 06:12

## 2019-12-27 RX ADMIN — LEVOFLOXACIN 750 MG: 250 TABLET, FILM COATED ORAL at 09:12

## 2019-12-27 RX ADMIN — POTASSIUM & SODIUM PHOSPHATES POWDER PACK 280-160-250 MG 1 PACKET: 280-160-250 PACK at 08:12

## 2019-12-27 RX ADMIN — METHYLPREDNISOLONE SODIUM SUCCINATE 80 MG: 40 INJECTION, POWDER, FOR SOLUTION INTRAMUSCULAR; INTRAVENOUS at 09:12

## 2019-12-27 RX ADMIN — METHYLPREDNISOLONE SODIUM SUCCINATE 80 MG: 40 INJECTION, POWDER, FOR SOLUTION INTRAMUSCULAR; INTRAVENOUS at 01:12

## 2019-12-27 RX ADMIN — METHYLPREDNISOLONE SODIUM SUCCINATE 80 MG: 125 INJECTION, POWDER, FOR SOLUTION INTRAMUSCULAR; INTRAVENOUS at 06:12

## 2019-12-27 RX ADMIN — POTASSIUM & SODIUM PHOSPHATES POWDER PACK 280-160-250 MG 1 PACKET: 280-160-250 PACK at 09:12

## 2019-12-27 RX ADMIN — POTASSIUM CHLORIDE 40 MEQ: 1500 TABLET, EXTENDED RELEASE ORAL at 06:12

## 2019-12-27 RX ADMIN — IPRATROPIUM BROMIDE AND ALBUTEROL SULFATE 3 ML: .5; 3 SOLUTION RESPIRATORY (INHALATION) at 09:12

## 2019-12-27 RX ADMIN — DOXYCYCLINE HYCLATE 100 MG: 100 TABLET, COATED ORAL at 02:12

## 2019-12-27 RX ADMIN — POTASSIUM & SODIUM PHOSPHATES POWDER PACK 280-160-250 MG 1 PACKET: 280-160-250 PACK at 05:12

## 2019-12-27 RX ADMIN — POTASSIUM & SODIUM PHOSPHATES POWDER PACK 280-160-250 MG 1 PACKET: 280-160-250 PACK at 11:12

## 2019-12-27 RX ADMIN — POTASSIUM CHLORIDE 40 MEQ: 20 TABLET, EXTENDED RELEASE ORAL at 06:12

## 2019-12-27 RX ADMIN — FLUTICASONE FUROATE AND VILANTEROL TRIFENATATE 1 PUFF: 100; 25 POWDER RESPIRATORY (INHALATION) at 09:12

## 2019-12-27 RX ADMIN — ONDANSETRON 4 MG: 4 TABLET, ORALLY DISINTEGRATING ORAL at 09:12

## 2019-12-27 RX ADMIN — IPRATROPIUM BROMIDE AND ALBUTEROL SULFATE 3 ML: .5; 3 SOLUTION RESPIRATORY (INHALATION) at 02:12

## 2019-12-27 NOTE — ED NOTES
Assisted pt to restroom for urine collection. Pt given apple sauce and jello per request. Awaiting results and dispo. Will continue to monitor.

## 2019-12-27 NOTE — DISCHARGE INSTRUCTIONS
Take the doxycycline twice a day as prescribed.  To see your personal physician as soon as possible.  To use her inhalers as per your routine.  To not hesitate to return if your breathing worsens.  To be recheck otherwise as needed.

## 2019-12-27 NOTE — ED NOTES
Pt sitting up in bed. Breathing tx in progress. Updated on plan of care. Waiting on MD disposition.

## 2019-12-27 NOTE — HOSPITAL COURSE
12/27/2019:  Patient seen and examined.  Patient 59-year-old who presented with complaints of shortness of breath that she states has been going on for least 1 week and probably 2 weeks.  Patient states that she began to feel short of breath and cough approximately 1 week ago and it worsened throughout the week until presentation to the emergency department when she states it became worse and precipitated her visit to the emergency department for evaluation.  Patient has had no fevers overnight but she states she has been having low-grade temps in the  range with nonproductive cough over the last 1 week at home patient has a past medical history of coronary artery disease, anxiety, COPD, and hypertension    12/28/2019:  Patient reports that she had an exacerbation of her COPD last night.  She reports increased shortness of breath and cough.  This morning she is feeling fatigued but her breathing has improved.  Patient has been afebrile overnight.  She is currently on 2 L of nasal cannula oxygen which is consistent with her home use.  Her white blood cell count has increased to 13.69 but she has also been on high-dose steroids.  Chest x-ray shows a Masslike pulmonary infiltrate of the right upper lobe and it was recommended to get a CT of the chest to further evaluate.    12/29/2019:  Patient reports that she feels better this morning.  Her shortness of breath his present when she walks.  Walking O2 test today shows a oxygen saturation of 84% on room air.  She was unable to complete the walk.  She has a home oxygen concentrator and does not leave the house.  She does not have portable oxygen.  White blood cell count has increased today to 15 but she has been on high-dose steroids.  She has been afebrile.  CT scan done yesterday shows scarring in the lung but no mass or infiltrate.  Patient has received the maximum benefit from hospitalization and will be discharged home with close follow-up with her PCP.   Patient will complete a steroid taper and antibiotics and will receive portable oxygen before discharge.     **unable to obtain portable O2 secondary to insurance issue. Unsafe for discharge today so will hold discharge until portable O2 can be obtained.    12/30/2019:  Patient able to have oxygen delivered this morning.  For oxygen has been delivered patient ready for discharge. Prescriptions were called to [facility] yesterday and patient otherwise is stable for discharge. I agree with the discharge medications as listed and steroid taper.  Patient to follow with her primary care physician 1 week

## 2019-12-27 NOTE — HPI
The patient is a 60 y/o female with PMH of COPD on home O2 at 2L who presents with a one week history of worsening SOB. Symptom similar to her previous COPD flares. She reports associated low grade temps in the 99s and 100s and a non-productive cough. She finally could not handle the symptoms any longer as she was not feeling well overall and then decided to come to the ER. She denies any chest pain, nausea, vomiting, diarrhea, dysuria, sick contact exposures or other symptoms.

## 2019-12-27 NOTE — SUBJECTIVE & OBJECTIVE
Past Medical History:   Diagnosis Date    Anxiety     COPD (chronic obstructive pulmonary disease)     Coronary artery disease     Hypertension        Past Surgical History:   Procedure Laterality Date    APPENDECTOMY      CARPAL TUNNEL RELEASE Left      SECTION      CHOLECYSTECTOMY      CORONARY ANGIOPLASTY WITH STENT PLACEMENT      HYSTERECTOMY         Review of patient's allergies indicates:   Allergen Reactions    Codeine     Pcn [penicillins]        No current facility-administered medications on file prior to encounter.      Current Outpatient Medications on File Prior to Encounter   Medication Sig    albuterol (PROVENTIL) 2.5 mg /3 mL (0.083 %) nebulizer solution Take 3 mLs (2.5 mg total) by nebulization every 6 (six) hours as needed for Wheezing.    alprazolam (XANAX) 0.5 MG tablet Take 0.25 mg by mouth daily as needed for Anxiety.    ipratropium (ATROVENT) 0.02 % nebulizer solution ipratropium bromide 0.02 % solution for inhalation   INHALE THE CONTENTS OF 1 VIAL WITH NEBULIZER USE EVERY 4 HOURS AS NEEDED WITH ALBUTEROL     Family History     None        Tobacco Use    Smoking status: Former Smoker   Substance and Sexual Activity    Alcohol use: No    Drug use: No    Sexual activity: Never     Review of Systems   Constitutional: Positive for activity change, fatigue and fever. Negative for chills.   HENT: Negative for congestion, sore throat and trouble swallowing.    Respiratory: Positive for cough, shortness of breath and wheezing. Negative for chest tightness.    Cardiovascular: Negative for chest pain, palpitations and leg swelling.   Gastrointestinal: Negative for abdominal pain, diarrhea, nausea and vomiting.   Genitourinary: Negative for difficulty urinating, dysuria, flank pain and frequency.   Musculoskeletal: Negative.    Skin: Negative.    Neurological: Negative.    Hematological: Negative.    Psychiatric/Behavioral: Negative.      Objective:     Vital Signs (Most  Recent):  Temp: 96.2 °F (35.7 °C) (12/27/19 0400)  Pulse: 106 (12/27/19 0400)  Resp: 16 (12/27/19 0400)  BP: (!) 140/68 (12/27/19 0400)  SpO2: (!) 94 % (12/27/19 0522) Vital Signs (24h Range):  Temp:  [96.2 °F (35.7 °C)-98.5 °F (36.9 °C)] 96.2 °F (35.7 °C)  Pulse:  [] 106  Resp:  [14-28] 16  SpO2:  [94 %-100 %] 94 %  BP: (107-149)/(44-92) 140/68     Weight: 68.9 kg (151 lb 14.4 oz)  Body mass index is 27.78 kg/m².    Physical Exam   Constitutional: She is oriented to person, place, and time. She appears well-developed and well-nourished. No distress.   Cardiovascular:   Mildly tachycardic   Pulmonary/Chest: Effort normal. No respiratory distress.   Abdominal: Soft. There is no tenderness.   Musculoskeletal: She exhibits no edema.   Neurological: She is alert and oriented to person, place, and time.   Psychiatric: She has a normal mood and affect.   Vitals reviewed.          Significant Labs: All pertinent labs within the past 24 hours have been reviewed.    Significant Imaging: I have reviewed all pertinent imaging results/findings within the past 24 hours.

## 2019-12-27 NOTE — H&P
[facility] - [facility] Hospital Medicine  History & Physical      The patient location is: [facility]  The chief complaint leading to consultation is: SOB  Visit type: Virtual visit with synchronous audio and video  Total time spent with patient: 15 mins  Each patient to whom he or she provides medical services by telemedicine is:  (1) informed of the relationship between the physician and patient and the respective role of any other health care provider with respect to management of the patient; and (2) notified that he or she may decline to receive medical services by telemedicine and may withdraw from such care at any time.        Patient Name: Odessa Cm  MRN: [id number]  Admission Date: 2019  Attending Physician: Zen Blackman MD   Primary Care Provider: Primary Doctor No         Patient information was obtained from patient and ER records.     Subjective:     Principal Problem:COPD exacerbation    Chief Complaint:   Chief Complaint   Patient presents with    Shortness of Breath     hx of COPD pt reports low grade fever. pt received Duoneb, Zofran 4 mg and Solumedrol 125 mg in route        HPI: The patient is a 58 y/o female with PMH of COPD on home O2 at  who presents with a one week history of worsening SOB. Symptom similar to her previous COPD flares. She reports associated low grade temps in the 99s and 100s and a non-productive cough. She finally could not handle the symptoms any longer as she was not feeling well overall and then decided to come to the ER. She denies any chest pain, nausea, vomiting, diarrhea, dysuria, sick contact exposures or other symptoms.    Past Medical History:   Diagnosis Date    Anxiety     COPD (chronic obstructive pulmonary disease)     Coronary artery disease     Hypertension        Past Surgical History:   Procedure Laterality Date    APPENDECTOMY      CARPAL TUNNEL RELEASE Left      SECTION      CHOLECYSTECTOMY       CORONARY ANGIOPLASTY WITH STENT PLACEMENT      HYSTERECTOMY         Review of patient's allergies indicates:   Allergen Reactions    Codeine     Pcn [penicillins]        No current facility-administered medications on file prior to encounter.      Current Outpatient Medications on File Prior to Encounter   Medication Sig    albuterol (PROVENTIL) 2.5 mg /3 mL (0.083 %) nebulizer solution Take 3 mLs (2.5 mg total) by nebulization every 6 (six) hours as needed for Wheezing.    alprazolam (XANAX) 0.5 MG tablet Take 0.25 mg by mouth daily as needed for Anxiety.    ipratropium (ATROVENT) 0.02 % nebulizer solution ipratropium bromide 0.02 % solution for inhalation   INHALE THE CONTENTS OF 1 VIAL WITH NEBULIZER USE EVERY 4 HOURS AS NEEDED WITH ALBUTEROL     Family History     None        Tobacco Use    Smoking status: Former Smoker   Substance and Sexual Activity    Alcohol use: No    Drug use: No    Sexual activity: Never     Review of Systems   Constitutional: Positive for activity change, fatigue and fever. Negative for chills.   HENT: Negative for congestion, sore throat and trouble swallowing.    Respiratory: Positive for cough, shortness of breath and wheezing. Negative for chest tightness.    Cardiovascular: Negative for chest pain, palpitations and leg swelling.   Gastrointestinal: Negative for abdominal pain, diarrhea, nausea and vomiting.   Genitourinary: Negative for difficulty urinating, dysuria, flank pain and frequency.   Musculoskeletal: Negative.    Skin: Negative.    Neurological: Negative.    Hematological: Negative.    Psychiatric/Behavioral: Negative.      Objective:     Vital Signs (Most Recent):  Temp: 96.2 °F (35.7 °C) (12/27/19 0400)  Pulse: 106 (12/27/19 0400)  Resp: 16 (12/27/19 0400)  BP: (!) 140/68 (12/27/19 0400)  SpO2: (!) 94 % (12/27/19 0522) Vital Signs (24h Range):  Temp:  [96.2 °F (35.7 °C)-98.5 °F (36.9 °C)] 96.2 °F (35.7 °C)  Pulse:  [] 106  Resp:  [14-28] 16  SpO2:   [94 %-100 %] 94 %  BP: (107-149)/(44-92) 140/68     Weight: 68.9 kg (151 lb 14.4 oz)  Body mass index is 27.78 kg/m².    Physical Exam   Constitutional: She is oriented to person, place, and time. She appears well-developed and well-nourished. No distress.   Cardiovascular:   Mildly tachycardic   Pulmonary/Chest: Effort normal. No respiratory distress.   Abdominal: Soft. There is no tenderness.   Musculoskeletal: She exhibits no edema.   Neurological: She is alert and oriented to person, place, and time.   Psychiatric: She has a normal mood and affect.   Vitals reviewed.          Significant Labs: All pertinent labs within the past 24 hours have been reviewed.    Significant Imaging: I have reviewed all pertinent imaging results/findings within the past 24 hours.    Assessment/Plan:     * COPD exacerbation  Presenting with one week history of cough and SOB that seems like her COPD flare  Afebrile currently and no leukocytosis  Continue steroids with IV solumedrol  Add levaquin for bacterial pathogens as a possible etiology  Follow up official CXR read  Continue O2 at 2L NC and titrate up if need (2L is her home dose)  Duo nebs PRN  Added breo   Appears clinically stable       Anxiety  Continue home dose xanax daily PRN         VTE Risk Mitigation (From admission, onward)         Ordered     IP VTE LOW RISK PATIENT  Once      12/27/19 0321                   Shirlene Liz MD  Department of Hospital Medicine   [facility] - Med [facility]urg

## 2019-12-27 NOTE — ED PROVIDER NOTES
Encounter Date: 12/26/2019       History     Chief Complaint   Patient presents with    Shortness of Breath     hx of COPD pt reports low grade fever. pt received Duoneb, Zofran 4 mg and Solumedrol 125 mg in route     HISTORY OF THE PRESENT ILLNESS:  The patient is a 59-year-old female seen by me at 8:28 p.m..  She has a history of COPD.  She was fine until approximately an hour ago which time she developed increasing shortness of breath. She states she gets these symptoms every few months.  She is on an inhaled steroid.  Her last antibiotics were months ago.    PAST MEDICAL HISTORY:  Positive for COPD and some heart disease.  She has had an appendectomy, hysterectomy and cholecystectomy.  She has also had stents for her heart, orthopedic surgery, and hernia repair.  She is on aspirin as a blood thinner.    SOCIAL HISTORY:  She is a smoker who quit 4 years ago.  She does not use alcohol or street drugs.  She lives in a trailer with her daughter.      REVIEW OF SYSTEMS:  She has had a fever to 100.4.  She has had mild chills.  She has not had any syncope or seizure.  She has had moderate nausea without vomiting or hematemesis.  She denies diarrhea, bright red blood per rectum, melena or rash.  She has had a mild cough without hemoptysis.  She has had a mild runny nose and sore throat. She denies dysuria increased urinary frequency or hematuria.  She gets occasional chest pains when she coughs.  She does not have any abdominal pain. She gets occasional swelling of her feet or ankles.  She does not have any new visual problem.  She has a moderate headache she denies any significant dizziness.          PHYSICAL EXAMINATION:    Well developed, well nourished female in mild-to-moderate respiratory distress.     Vital Signs:  98.4     130/71      94     17    100%    HEAD. Normocephalic, atraumatic.    EYES. No conjunctivitis. Nonicteric.    EARS. Tympanic membranes within normal limits.    NOSE. No significant  discharge.  MOUTH. No lesions.   THROAT. No pharyngitis. Uvula midline.    NECK. Supple.   LUNGS. Clear to percussion.  Mild diffuse expiratory greater than inspiratory wheezes upon auscultation. Breath sounds equal bilaterally.  CARDIAC. S1 & S2 normal without murmurs, gallops or rubs. Heart rhythm regular. No increased jugular vein distension.   BACK. No costovertebral angle tenderness.   ABDOMEN. Bowel sounds within normal limits. Soft. No mass or significant tenderness.  EXTREMITIES. No clubbing, cyanosis or edema. No Urbano's sign or calf tenderness.  SKIN. No significant rash.   NEUROLOGIC. Grossly within normal limits.    EKG shows a sinus rhythm at 112 beats per minute. Axis is 74°.  There are no acute or old changes of significance.  There are no premature beats.    CBC shows a white count of 9 with an H&H of 11 and 36 her platelets are 216,000  Electrolytes are fine except for a CO2 of 32.  BUN and creatinine are 13 and 0.7 with a GFR greater than 60 mL/min.  Glucose is 129.  Magnesium is 1.9.  Liver function tests were fine.  BNP is 58 and troponin is 0.01.    Urinalysis is fine.    Chest x-ray shows some increased marking on with the right greater than the left lower lung fields.  She has COPD with hyperexpansion and flattened diaphragms.    HOSPITAL COURSE:  The patient was given Solu-Medrol in route to the hospital.  We gave her DuoNeb treatments.  She improved significantly.  She states she was hard ball when she 1st got here and now is at 60% of her optimum.  She only was able to blow at 160 or so with her peak flow.  I offered and encouraged her to be admitted.  She does not wish to do that.  She is with her daughter who states she will watch her closely at home.  We gave the patient doxycycline.  She knows that she needs to be watched closely and to return to the emergency department if her shortness of breath recurs.  She should definitely follow up with her personal physician, hopefully today but  definitely by Monday at the latest.      DIAGNOSIS:  Acute exacerbation of COPD with possible early pneumonia.      DISCHARGE PLAN:  Take the doxycycline twice a day as prescribed.  To see your personal physician as soon as possible.  To use her inhalers as per your routine.  To not hesitate to return if your breathing worsens.  To be recheck otherwise as needed.    The patient changed her mind after I had her set for discharge. She will remain in the hospital after all.  I think that is a good idea.    I spoke with Dr. HONORIO Liz at 3:15 a.m..  Dr. Liz graciously agreed to accept the patient for admission.  We will place her in an obs bed.    Patient's condition is fair.        Review of patient's allergies indicates:   Allergen Reactions    Codeine     Pcn [penicillins]      Past Medical History:   Diagnosis Date    Anxiety     COPD (chronic obstructive pulmonary disease)     Coronary artery disease     Hypertension      Past Surgical History:   Procedure Laterality Date    APPENDECTOMY      CARPAL TUNNEL RELEASE Left      SECTION      CHOLECYSTECTOMY      CORONARY ANGIOPLASTY WITH STENT PLACEMENT      HYSTERECTOMY       No family history on file.  Social History     Tobacco Use    Smoking status: Former Smoker   Substance Use Topics    Alcohol use: No    Drug use: No     Review of Systems    Physical Exam     Initial Vitals [19]   BP Pulse Resp Temp SpO2   (!) 148/44 (!) 126 (!) 28 98.5 °F (36.9 °C) 96 %      MAP       --         Physical Exam    ED Course   Procedures  Labs Reviewed   CBC W/ AUTO DIFFERENTIAL - Abnormal; Notable for the following components:       Result Value    RBC 3.76 (*)     Hemoglobin 11.0 (*)     Hematocrit 36.2 (*)     Mean Corpuscular Hemoglobin Conc 30.4 (*)     Lymph # 0.9 (*)     Gran% 84.6 (*)     Lymph% 9.7 (*)     All other components within normal limits   COMPREHENSIVE METABOLIC PANEL - Abnormal; Notable for the following components:    CO2 32  (*)     Glucose 129 (*)     Calcium 8.0 (*)     Albumin 3.2 (*)     Anion Gap 6 (*)     All other components within normal limits   TROPONIN I - Abnormal; Notable for the following components:    Troponin I 0.01 (*)     All other components within normal limits   B-TYPE NATRIURETIC PEPTIDE   URINALYSIS   MAGNESIUM          Imaging Results          X-Ray Chest PA And Lateral (In process)                                                  Clinical Impression:   Acute exacerbation of COPD with possible early pneumonia.                          Mya Mendez MD  12/27/19 0301       Mya Mendez MD  12/27/19 0319       Mya Mendez MD  12/27/19 0784

## 2019-12-27 NOTE — ED NOTES
Dr. Mendez notified that pt states she will be unable to go home without oxygen and wants to be admitted. V/u Physician at bedside.

## 2019-12-27 NOTE — SUBJECTIVE & OBJECTIVE
Interval History:     Review of Systems   Constitutional: Positive for fatigue. Negative for activity change, appetite change and fever.   HENT: Negative for congestion, ear discharge, mouth sores, nosebleeds, rhinorrhea, sinus pressure, sinus pain and tinnitus.    Eyes: Negative.  Negative for pain, redness and itching.   Respiratory: Positive for chest tightness, shortness of breath and wheezing. Negative for apnea, cough, choking and stridor.    Cardiovascular: Positive for leg swelling. Negative for chest pain and palpitations.   Gastrointestinal: Negative for abdominal distention, abdominal pain, anal bleeding, blood in stool, constipation and diarrhea.   Endocrine: Negative.    Genitourinary: Negative for difficulty urinating, flank pain, frequency and urgency.   Musculoskeletal: Positive for arthralgias and myalgias. Negative for back pain and gait problem.   Skin: Negative for color change and pallor.   Allergic/Immunologic: Negative.    Neurological: Positive for dizziness, weakness, light-headedness and headaches. Negative for facial asymmetry.   Hematological: Negative for adenopathy. Does not bruise/bleed easily.     Objective:     Vital Signs (Most Recent):  Temp: 97.5 °F (36.4 °C) (12/27/19 1534)  Pulse: 104 (12/27/19 1534)  Resp: 18 (12/27/19 1534)  BP: 126/61 (12/27/19 1534)  SpO2: 95 % (12/27/19 1534) Vital Signs (24h Range):  Temp:  [96.2 °F (35.7 °C)-98.5 °F (36.9 °C)] 97.5 °F (36.4 °C)  Pulse:  [] 104  Resp:  [14-28] 18  SpO2:  [94 %-100 %] 95 %  BP: (107-149)/(44-92) 126/61     Weight: 68.9 kg (151 lb 14.4 oz)  Body mass index is 27.78 kg/m².    Intake/Output Summary (Last 24 hours) at 12/27/2019 1620  Last data filed at 12/27/2019 1000  Gross per 24 hour   Intake 240 ml   Output --   Net 240 ml      Physical Exam   Constitutional: She is oriented to person, place, and time. She appears well-developed and well-nourished. She appears distressed.   HENT:   Head: Normocephalic and atraumatic.    Eyes: Pupils are equal, round, and reactive to light. EOM are normal.   Neck: Normal range of motion. Neck supple. No tracheal deviation present. No thyromegaly present.   Cardiovascular: Normal rate, regular rhythm and normal heart sounds.   Pulmonary/Chest: She is in respiratory distress. She has wheezes. She has rales.   Abdominal: Soft. Bowel sounds are normal. She exhibits no distension. There is no tenderness. There is no rebound and no guarding.   Musculoskeletal: Normal range of motion. She exhibits edema.   Lymphadenopathy:     She has no cervical adenopathy.   Neurological: She is alert and oriented to person, place, and time. She exhibits normal muscle tone.   Skin: Skin is warm and dry. Capillary refill takes less than 2 seconds.   Psychiatric: She has a normal mood and affect. Her behavior is normal. Judgment and thought content normal.   Nursing note and vitals reviewed.      Significant Labs:   Recent Lab Results       12/27/19  0554   12/27/19  0030   12/26/19  2120        Albumin     3.2     Alkaline Phosphatase     66     ALT     10     Anion Gap 7   6     Appearance, UA   Clear       AST     15     Baso # 0.00   0.03     Basophil% 0.0   0.3     Bilirubin (UA)   Negative       BILIRUBIN TOTAL     0.4  Comment:  For infants and newborns, interpretation of results should be based  on gestational age, weight and in agreement with clinical  observations.  Premature Infant recommended reference ranges:  Up to 24 hours.............<8.0 mg/dL  Up to 48 hours............<12.0 mg/dL  3-5 days..................<15.0 mg/dL  6-29 days.................<15.0 mg/dL       BNP     58  Comment:  Values of less than 100 pg/ml are consistent with non-CHF populations.     BUN, Bld 13   13     Calcium 8.1   8.0     Chloride 101   103     CO2 32   32     Color, UA   Yellow       Creatinine 0.7   0.7     Differential Method Automated   Automated     eGFR if  >60.0   >60.0     eGFR if non African American  >60.0  Comment:  Calculation used to obtain the estimated glomerular filtration  rate (eGFR) is the CKD-EPI equation.      >60.0  Comment:  Calculation used to obtain the estimated glomerular filtration  rate (eGFR) is the CKD-EPI equation.        Eos # 0.0   0.1     Eosinophil% 0.0   0.8     Glucose 177   129     Glucose, UA   Negative       Gran # (ANC) 6.4   7.6     Gran% 94.5   84.6     Hematocrit 34.1   36.2     Hemoglobin 10.4   11.0     Immature Grans (Abs) 0.01  Comment:  Mild elevation in immature granulocytes is non specific and   can be seen in a variety of conditions including stress response,   acute inflammation, trauma and pregnancy. Correlation with other   laboratory and clinical findings is essential.     0.02  Comment:  Mild elevation in immature granulocytes is non specific and   can be seen in a variety of conditions including stress response,   acute inflammation, trauma and pregnancy. Correlation with other   laboratory and clinical findings is essential.       Immature Granulocytes 0.1   0.2     Ketones, UA   Negative       Leukocytes, UA   Negative       Lymph # 0.3   0.9     Lymph% 5.0   9.7     Magnesium 2.1   1.9     MCH 29.1   29.3     MCHC 30.5   30.4     MCV 96   96     Mono # 0.0   0.4     Mono% 0.4   4.4     MPV 10.7   10.6     NITRITE UA   Negative       nRBC 0   0     Occult Blood UA   Negative       pH, UA   6.0       Phosphorus 2.7   2.4     Platelets 228   216     Potassium 3.9   3.5     PROTEIN TOTAL     7.5     Protein, UA   Negative  Comment:  Recommend a 24 hour urine protein or a urine   protein/creatinine ratio if globulin induced proteinuria is  clinically suspected.         RBC 3.57   3.76     RDW 12.4   12.4     Sodium 140   141     Specific Andrews, UA   1.015       Specimen UA   Urine, Clean Catch       Troponin I     0.01     UROBILINOGEN UA   1.0       WBC 6.74   8.96         All pertinent labs within the past 24 hours have been reviewed.    Significant Imaging: I  have reviewed and interpreted all pertinent imaging results/findings within the past 24 hours.

## 2019-12-27 NOTE — ED NOTES
Pt aaox3. resp e/u. Skin wdp. Vss. nad noted. Pt awaiting results and dispo. Will continue to monitor.

## 2019-12-27 NOTE — PLAN OF CARE
"   12/27/19 0924   Discharge Assessment   Assessment Type Discharge Planning Assessment   Confirmed/corrected address and phone number on facesheet? Yes   Assessment information obtained from? Patient   Expected Length of Stay (days) 2   Communicated expected length of stay with patient/caregiver yes   Prior to hospitilization cognitive status: Alert/Oriented   Prior to hospitalization functional status: Independent   Current cognitive status: Alert/Oriented   Current Functional Status: Independent   Lives With other (see comments)  (Lives with daughter and son-in-law whom both work, so per patient "I am basically alone")   Able to Return to Prior Arrangements yes   Is patient able to care for self after discharge? Yes   Who are your caregiver(s) and their phone number(s)? Noemy Warren  [phone]   Patient's perception of discharge disposition home or selfcare   Readmission Within the Last 30 Days no previous admission in last 30 days   Patient currently being followed by outpatient case management? No   Patient currently receives any other outside agency services? No   Equipment Currently Used at Home nebulizer;oxygen   Do you have any problems affording any of your prescribed medications? TBD   Is the patient taking medications as prescribed? yes   Does the patient have transportation home? Yes   Transportation Anticipated family or friend will provide   Does the patient receive services at the [facility]? No   Discharge Plan A Home with family   DME Needed Upon Discharge  none   Patient/Family in Agreement with Plan yes     Patient resting in bed with family in room. States she lives at home with dori and son-in-law, but they both work and she is mostly alone.  Denies use of home health services.  States she uses oxygen and nebulizer at home.  States she often does not get her medications due to being unable to afford because she pays most of the bills.  Case management will continue to " follow for further needs.

## 2019-12-27 NOTE — ASSESSMENT & PLAN NOTE
Presenting with one week history of cough and SOB that seems like her COPD flare  Afebrile currently and no leukocytosis  Continue steroids with IV solumedrol  Add levaquin for bacterial pathogens as a possible etiology  Follow up official CXR read  Continue O2 at 2L NC and titrate up if need (2L is her home dose)  Duo nebs PRN  Added breo   Appears clinically stable     12/27/2019:  1.  Patient continues to be stable and states feeling much better  Continue current medications with albuterol Atrovent nebulization treatments for  Begin empiric antibiotics patient been started  Continue oxygen at 2 L nasal cannula.  Continue home medications as prescribed

## 2019-12-27 NOTE — PROGRESS NOTES
[facility] Medicine  Progress Note    Patient Name: Odessa Cm  MRN: [id number]  Patient Class: OP- Observation   Admission Date: 12/26/2019  Length of Stay: 0 days  Attending Physician: Zen Blackman MD  Primary Care Provider: CLEMENTE Diez        Subjective:     Principal Problem:COPD exacerbation        HPI:  The patient is a 60 y/o female with PMH of COPD on home O2 at 2L who presents with a one week history of worsening SOB. Symptom similar to her previous COPD flares. She reports associated low grade temps in the 99s and 100s and a non-productive cough. She finally could not handle the symptoms any longer as she was not feeling well overall and then decided to come to the ER. She denies any chest pain, nausea, vomiting, diarrhea, dysuria, sick contact exposures or other symptoms.    Overview/Hospital Course:  12/27/2019:  Patient seen and examined.  Patient 59-year-old who presented with complaints of shortness of breath that she states has been going on for least 1 week and probably 2 weeks.  Patient states that she began to feel short of breath and cough approximately 1 week ago and it worsened throughout the week until presentation to the emergency department when she states it became worse and precipitated her visit to the emergency department for evaluation.  Patient has had no fevers overnight but she states she has been having low-grade temps in the  range with nonproductive cough over the last 1 week at home patient has a past medical history of coronary artery disease, anxiety, COPD, and hypertension    Interval History:     Review of Systems   Constitutional: Positive for fatigue. Negative for activity change, appetite change and fever.   HENT: Negative for congestion, ear discharge, mouth sores, nosebleeds, rhinorrhea, sinus pressure, sinus pain and tinnitus.    Eyes: Negative.  Negative for pain, redness and itching.   Respiratory:  Positive for chest tightness, shortness of breath and wheezing. Negative for apnea, cough, choking and stridor.    Cardiovascular: Positive for leg swelling. Negative for chest pain and palpitations.   Gastrointestinal: Negative for abdominal distention, abdominal pain, anal bleeding, blood in stool, constipation and diarrhea.   Endocrine: Negative.    Genitourinary: Negative for difficulty urinating, flank pain, frequency and urgency.   Musculoskeletal: Positive for arthralgias and myalgias. Negative for back pain and gait problem.   Skin: Negative for color change and pallor.   Allergic/Immunologic: Negative.    Neurological: Positive for dizziness, weakness, light-headedness and headaches. Negative for facial asymmetry.   Hematological: Negative for adenopathy. Does not bruise/bleed easily.     Objective:     Vital Signs (Most Recent):  Temp: 97.5 °F (36.4 °C) (12/27/19 1534)  Pulse: 104 (12/27/19 1534)  Resp: 18 (12/27/19 1534)  BP: 126/61 (12/27/19 1534)  SpO2: 95 % (12/27/19 1534) Vital Signs (24h Range):  Temp:  [96.2 °F (35.7 °C)-98.5 °F (36.9 °C)] 97.5 °F (36.4 °C)  Pulse:  [] 104  Resp:  [14-28] 18  SpO2:  [94 %-100 %] 95 %  BP: (107-149)/(44-92) 126/61     Weight: 68.9 kg (151 lb 14.4 oz)  Body mass index is 27.78 kg/m².    Intake/Output Summary (Last 24 hours) at 12/27/2019 1620  Last data filed at 12/27/2019 1000  Gross per 24 hour   Intake 240 ml   Output --   Net 240 ml      Physical Exam   Constitutional: She is oriented to person, place, and time. She appears well-developed and well-nourished. She appears distressed.   HENT:   Head: Normocephalic and atraumatic.   Eyes: Pupils are equal, round, and reactive to light. EOM are normal.   Neck: Normal range of motion. Neck supple. No tracheal deviation present. No thyromegaly present.   Cardiovascular: Normal rate, regular rhythm and normal heart sounds.   Pulmonary/Chest: She is in respiratory distress. She has wheezes. She has rales.    Abdominal: Soft. Bowel sounds are normal. She exhibits no distension. There is no tenderness. There is no rebound and no guarding.   Musculoskeletal: Normal range of motion. She exhibits edema.   Lymphadenopathy:     She has no cervical adenopathy.   Neurological: She is alert and oriented to person, place, and time. She exhibits normal muscle tone.   Skin: Skin is warm and dry. Capillary refill takes less than 2 seconds.   Psychiatric: She has a normal mood and affect. Her behavior is normal. Judgment and thought content normal.   Nursing note and vitals reviewed.      Significant Labs:   Recent Lab Results       12/27/19  0554   12/27/19  0030   12/26/19  2120        Albumin     3.2     Alkaline Phosphatase     66     ALT     10     Anion Gap 7   6     Appearance, UA   Clear       AST     15     Baso # 0.00   0.03     Basophil% 0.0   0.3     Bilirubin (UA)   Negative       BILIRUBIN TOTAL     0.4  Comment:  For infants and newborns, interpretation of results should be based  on gestational age, weight and in agreement with clinical  observations.  Premature Infant recommended reference ranges:  Up to 24 hours.............<8.0 mg/dL  Up to 48 hours............<12.0 mg/dL  3-5 days..................<15.0 mg/dL  6-29 days.................<15.0 mg/dL       BNP     58  Comment:  Values of less than 100 pg/ml are consistent with non-CHF populations.     BUN, Bld 13   13     Calcium 8.1   8.0     Chloride 101   103     CO2 32   32     Color, UA   Yellow       Creatinine 0.7   0.7     Differential Method Automated   Automated     eGFR if  >60.0   >60.0     eGFR if non  >60.0  Comment:  Calculation used to obtain the estimated glomerular filtration  rate (eGFR) is the CKD-EPI equation.      >60.0  Comment:  Calculation used to obtain the estimated glomerular filtration  rate (eGFR) is the CKD-EPI equation.        Eos # 0.0   0.1     Eosinophil% 0.0   0.8     Glucose 177   129     Glucose,  UA   Negative       Gran # (ANC) 6.4   7.6     Gran% 94.5   84.6     Hematocrit 34.1   36.2     Hemoglobin 10.4   11.0     Immature Grans (Abs) 0.01  Comment:  Mild elevation in immature granulocytes is non specific and   can be seen in a variety of conditions including stress response,   acute inflammation, trauma and pregnancy. Correlation with other   laboratory and clinical findings is essential.     0.02  Comment:  Mild elevation in immature granulocytes is non specific and   can be seen in a variety of conditions including stress response,   acute inflammation, trauma and pregnancy. Correlation with other   laboratory and clinical findings is essential.       Immature Granulocytes 0.1   0.2     Ketones, UA   Negative       Leukocytes, UA   Negative       Lymph # 0.3   0.9     Lymph% 5.0   9.7     Magnesium 2.1   1.9     MCH 29.1   29.3     MCHC 30.5   30.4     MCV 96   96     Mono # 0.0   0.4     Mono% 0.4   4.4     MPV 10.7   10.6     NITRITE UA   Negative       nRBC 0   0     Occult Blood UA   Negative       pH, UA   6.0       Phosphorus 2.7   2.4     Platelets 228   216     Potassium 3.9   3.5     PROTEIN TOTAL     7.5     Protein, UA   Negative  Comment:  Recommend a 24 hour urine protein or a urine   protein/creatinine ratio if globulin induced proteinuria is  clinically suspected.         RBC 3.57   3.76     RDW 12.4   12.4     Sodium 140   141     Specific [place], UA   1.015       Specimen UA   Urine, Clean Catch       Troponin I     0.01     UROBILINOGEN UA   1.0       WBC 6.74   8.96         All pertinent labs within the past 24 hours have been reviewed.    Significant Imaging: I have reviewed and interpreted all pertinent imaging results/findings within the past 24 hours.      Assessment/Plan:      * COPD exacerbation  Presenting with one week history of cough and SOB that seems like her COPD flare  Afebrile currently and no leukocytosis  Continue steroids with IV solumedrol  Add levaquin for  bacterial pathogens as a possible etiology  Follow up official CXR read  Continue O2 at 2L NC and titrate up if need (2L is her home dose)  Duo nebs PRN  Added breo   Appears clinically stable     12/27/2019:  1.  Patient continues to be stable and states feeling much better  Continue current medications with albuterol Atrovent nebulization treatments for  Begin empiric antibiotics patient been started  Continue oxygen at 2 L nasal cannula.  Continue home medications as prescribed      Anxiety  Continue home dose xanax daily PRN         VTE Risk Mitigation (From admission, onward)         Ordered     IP VTE LOW RISK PATIENT  Once      12/27/19 0321                      Zen Blackman MD  Department of Hospital Medicine   [facility]

## 2019-12-27 NOTE — ASSESSMENT & PLAN NOTE
Presenting with one week history of cough and SOB that seems like her COPD flare  Afebrile currently and no leukocytosis  Continue steroids with IV solumedrol  Add levaquin for bacterial pathogens as a possible etiology  Follow up official CXR read  Continue O2 at 2L NC and titrate up if need (2L is her home dose)  Duo nebs PRN  Added breo   Appears clinically stable

## 2019-12-27 NOTE — PLAN OF CARE
12/27/19 1035   HAMM Message   Medicare Outpatient and Observation Notification regarding financial responsibility Given to patient/caregiver;Explained to patient/caregiver   Date HAMM was signed 12/27/19   Time HAMM was signed 0912

## 2019-12-28 LAB
ANION GAP SERPL CALC-SCNC: 4 MMOL/L (ref 8–16)
BASOPHILS # BLD AUTO: 0.01 K/UL (ref 0–0.2)
BASOPHILS NFR BLD: 0.1 % (ref 0–1.9)
BUN SERPL-MCNC: 17 MG/DL (ref 6–20)
CALCIUM SERPL-MCNC: 8.4 MG/DL (ref 8.7–10.5)
CHLORIDE SERPL-SCNC: 103 MMOL/L (ref 95–110)
CO2 SERPL-SCNC: 35 MMOL/L (ref 23–29)
CREAT SERPL-MCNC: 0.8 MG/DL (ref 0.5–1.4)
DIFFERENTIAL METHOD: ABNORMAL
EOSINOPHIL # BLD AUTO: 0 K/UL (ref 0–0.5)
EOSINOPHIL NFR BLD: 0 % (ref 0–8)
ERYTHROCYTE [DISTWIDTH] IN BLOOD BY AUTOMATED COUNT: 12.5 % (ref 11.5–14.5)
EST. GFR  (AFRICAN AMERICAN): >60 ML/MIN/1.73 M^2
EST. GFR  (NON AFRICAN AMERICAN): >60 ML/MIN/1.73 M^2
GLUCOSE SERPL-MCNC: 155 MG/DL (ref 70–110)
HCT VFR BLD AUTO: 35.5 % (ref 37–48.5)
HGB BLD-MCNC: 10.8 G/DL (ref 12–16)
IMM GRANULOCYTES # BLD AUTO: 0.07 K/UL (ref 0–0.04)
IMM GRANULOCYTES NFR BLD AUTO: 0.5 % (ref 0–0.5)
LYMPHOCYTES # BLD AUTO: 0.5 K/UL (ref 1–4.8)
LYMPHOCYTES NFR BLD: 3.7 % (ref 18–48)
MAGNESIUM SERPL-MCNC: 2.2 MG/DL (ref 1.6–2.6)
MCH RBC QN AUTO: 28.8 PG (ref 27–31)
MCHC RBC AUTO-ENTMCNC: 30.4 G/DL (ref 32–36)
MCV RBC AUTO: 95 FL (ref 82–98)
MONOCYTES # BLD AUTO: 0.2 K/UL (ref 0.3–1)
MONOCYTES NFR BLD: 1.6 % (ref 4–15)
NEUTROPHILS # BLD AUTO: 12.9 K/UL (ref 1.8–7.7)
NEUTROPHILS NFR BLD: 94.1 % (ref 38–73)
NRBC BLD-RTO: 0 /100 WBC
PHOSPHATE SERPL-MCNC: 2.9 MG/DL (ref 2.7–4.5)
PLATELET # BLD AUTO: 247 K/UL (ref 150–350)
PMV BLD AUTO: 10.6 FL (ref 9.2–12.9)
POTASSIUM SERPL-SCNC: 4.5 MMOL/L (ref 3.5–5.1)
RBC # BLD AUTO: 3.75 M/UL (ref 4–5.4)
SODIUM SERPL-SCNC: 142 MMOL/L (ref 136–145)
WBC # BLD AUTO: 13.69 K/UL (ref 3.9–12.7)

## 2019-12-28 PROCEDURE — 25000003 PHARM REV CODE 250: Performed by: HOSPITALIST

## 2019-12-28 PROCEDURE — 94640 AIRWAY INHALATION TREATMENT: CPT

## 2019-12-28 PROCEDURE — 96376 TX/PRO/DX INJ SAME DRUG ADON: CPT | Mod: 59 | Performed by: INTERNAL MEDICINE

## 2019-12-28 PROCEDURE — 83735 ASSAY OF MAGNESIUM: CPT

## 2019-12-28 PROCEDURE — 63600175 PHARM REV CODE 636 W HCPCS: Performed by: HOSPITALIST

## 2019-12-28 PROCEDURE — 84100 ASSAY OF PHOSPHORUS: CPT

## 2019-12-28 PROCEDURE — 94761 N-INVAS EAR/PLS OXIMETRY MLT: CPT

## 2019-12-28 PROCEDURE — 25000003 PHARM REV CODE 250: Performed by: FAMILY MEDICINE

## 2019-12-28 PROCEDURE — 63600175 PHARM REV CODE 636 W HCPCS: Performed by: FAMILY MEDICINE

## 2019-12-28 PROCEDURE — 85025 COMPLETE CBC W/AUTO DIFF WBC: CPT

## 2019-12-28 PROCEDURE — 36415 COLL VENOUS BLD VENIPUNCTURE: CPT

## 2019-12-28 PROCEDURE — 96374 THER/PROPH/DIAG INJ IV PUSH: CPT | Mod: 59 | Performed by: INTERNAL MEDICINE

## 2019-12-28 PROCEDURE — 99226 PR SUBSEQUENT OBSERVATION CARE,LEVEL III: CPT | Mod: ,,, | Performed by: FAMILY MEDICINE

## 2019-12-28 PROCEDURE — 25000003 PHARM REV CODE 250: Performed by: INTERNAL MEDICINE

## 2019-12-28 PROCEDURE — G0378 HOSPITAL OBSERVATION PER HR: HCPCS

## 2019-12-28 PROCEDURE — 80048 BASIC METABOLIC PNL TOTAL CA: CPT

## 2019-12-28 PROCEDURE — 25500020 PHARM REV CODE 255: Performed by: INTERNAL MEDICINE

## 2019-12-28 PROCEDURE — 99226 PR SUBSEQUENT OBSERVATION CARE,LEVEL III: ICD-10-PCS | Mod: ,,, | Performed by: FAMILY MEDICINE

## 2019-12-28 PROCEDURE — 25000242 PHARM REV CODE 250 ALT 637 W/ HCPCS: Performed by: HOSPITALIST

## 2019-12-28 PROCEDURE — 27000221 HC OXYGEN, UP TO 24 HOURS

## 2019-12-28 RX ORDER — PANTOPRAZOLE SODIUM 40 MG/1
40 TABLET, DELAYED RELEASE ORAL DAILY
Status: DISCONTINUED | OUTPATIENT
Start: 2019-12-28 | End: 2019-12-30 | Stop reason: HOSPADM

## 2019-12-28 RX ADMIN — LEVOFLOXACIN 750 MG: 250 TABLET, FILM COATED ORAL at 09:12

## 2019-12-28 RX ADMIN — METHYLPREDNISOLONE SODIUM SUCCINATE 60 MG: 40 INJECTION, POWDER, FOR SOLUTION INTRAMUSCULAR; INTRAVENOUS at 08:12

## 2019-12-28 RX ADMIN — ONDANSETRON 4 MG: 4 TABLET, ORALLY DISINTEGRATING ORAL at 09:12

## 2019-12-28 RX ADMIN — IPRATROPIUM BROMIDE AND ALBUTEROL SULFATE 3 ML: .5; 3 SOLUTION RESPIRATORY (INHALATION) at 03:12

## 2019-12-28 RX ADMIN — FLUTICASONE FUROATE AND VILANTEROL TRIFENATATE 1 PUFF: 100; 25 POWDER RESPIRATORY (INHALATION) at 08:12

## 2019-12-28 RX ADMIN — METHYLPREDNISOLONE SODIUM SUCCINATE 80 MG: 40 INJECTION, POWDER, FOR SOLUTION INTRAMUSCULAR; INTRAVENOUS at 06:12

## 2019-12-28 RX ADMIN — IPRATROPIUM BROMIDE AND ALBUTEROL SULFATE 3 ML: .5; 3 SOLUTION RESPIRATORY (INHALATION) at 01:12

## 2019-12-28 RX ADMIN — IOHEXOL 75 ML: 350 INJECTION, SOLUTION INTRAVENOUS at 12:12

## 2019-12-28 RX ADMIN — IPRATROPIUM BROMIDE AND ALBUTEROL SULFATE 3 ML: .5; 3 SOLUTION RESPIRATORY (INHALATION) at 07:12

## 2019-12-28 RX ADMIN — PANTOPRAZOLE SODIUM 40 MG: 40 TABLET, DELAYED RELEASE ORAL at 01:12

## 2019-12-28 NOTE — SUBJECTIVE & OBJECTIVE
Review of Systems   Constitutional: Positive for fatigue. Negative for activity change, appetite change and fever.   HENT: Negative for congestion, ear discharge, mouth sores, nosebleeds, rhinorrhea, sinus pressure, sinus pain and tinnitus.    Eyes: Negative.  Negative for pain, redness and itching.   Respiratory: Positive for chest tightness, shortness of breath and wheezing. Negative for apnea, cough, choking and stridor.    Cardiovascular: Negative for chest pain, palpitations and leg swelling.   Gastrointestinal: Negative for abdominal distention, abdominal pain, anal bleeding, blood in stool, constipation and diarrhea.   Endocrine: Negative.    Genitourinary: Negative for difficulty urinating, flank pain, frequency and urgency.   Musculoskeletal: Positive for arthralgias and myalgias. Negative for back pain and gait problem.   Skin: Negative for color change and pallor.   Allergic/Immunologic: Negative.      Objective:     Vital Signs (Most Recent):  Temp: 97.5 °F (36.4 °C) (12/28/19 0428)  Pulse: 93 (12/28/19 0820)  Resp: 16 (12/28/19 0820)  BP: (!) 117/58 (12/28/19 0719)  SpO2: 100 % (12/28/19 0820) Vital Signs (24h Range):  Temp:  [96.5 °F (35.8 °C)-97.8 °F (36.6 °C)] 97.5 °F (36.4 °C)  Pulse:  [] 93  Resp:  [16-20] 16  SpO2:  [95 %-100 %] 100 %  BP: (108-155)/(53-67) 117/58     Weight: 68.9 kg (151 lb 14.4 oz)  Body mass index is 27.78 kg/m².    Intake/Output Summary (Last 24 hours) at 12/28/2019 0940  Last data filed at 12/28/2019 0900  Gross per 24 hour   Intake 1320 ml   Output --   Net 1320 ml      Physical Exam   Constitutional: She is oriented to person, place, and time. She appears well-developed and well-nourished. No distress.   HENT:   Head: Normocephalic and atraumatic.   Eyes: Pupils are equal, round, and reactive to light. EOM are normal.   Neck: Normal range of motion. Neck supple. No tracheal deviation present. No thyromegaly present.   Cardiovascular: Normal rate, regular rhythm  and normal heart sounds.   Pulmonary/Chest: No respiratory distress. She has wheezes. She has rales.   Abdominal: Soft. Bowel sounds are normal. She exhibits no distension. There is no tenderness. There is no rebound and no guarding.   Musculoskeletal: Normal range of motion.   Lymphadenopathy:     She has no cervical adenopathy.   Neurological: She is alert and oriented to person, place, and time. She exhibits normal muscle tone.   Skin: Skin is warm and dry. Capillary refill takes less than 2 seconds.   Psychiatric: She has a normal mood and affect. Her behavior is normal. Judgment and thought content normal.   Nursing note and vitals reviewed.      Significant Labs:   BMP:   Recent Labs   Lab 12/28/19  0605   *      K 4.5      CO2 35*   BUN 17   CREATININE 0.8   CALCIUM 8.4*   MG 2.2     CBC:   Recent Labs   Lab 12/26/19  2120 12/27/19  0554 12/28/19  0605   WBC 8.96 6.74 13.69*   HGB 11.0* 10.4* 10.8*   HCT 36.2* 34.1* 35.5*    228 247       Significant Imaging: I have reviewed all pertinent imaging results/findings within the past 24 hours.

## 2019-12-28 NOTE — ASSESSMENT & PLAN NOTE
Presenting with one week history of cough and SOB that seems like her COPD flare  Afebrile currently and no leukocytosis  Continue steroids with IV solumedrol  Add levaquin for bacterial pathogens as a possible etiology  Follow up official CXR read  Continue O2 at 2L NC and titrate up if need (2L is her home dose)  Duo nebs PRN  Added breo   Appears clinically stable     12/27/2019:  1.  Patient continues to be stable and states feeling much better  Continue current medications with albuterol Atrovent nebulization treatments for  Begin empiric antibiotics patient been started  Continue oxygen at 2 L nasal cannula.  Continue home medications as prescribed    12/28/2019:  Patient states that she had exacerbation last night but this morning she feels better  Will wean steroids to 60 mg Solu-Medrol Q 12  Follow-up chest CT given findings on chest x-ray  Continue Levaquin  Continue nasal cannula oxygen of 2 L  Continue home meds  Continue nebs

## 2019-12-28 NOTE — PROGRESS NOTES
[facility] Medicine  Progress Note    Patient Name: Odessa Cm  MRN: [id number]  Patient Class: OP- Observation   Admission Date: 12/26/2019  Length of Stay: 0 days  Attending Physician: Zen Blackman MD  Primary Care Provider: CLEMENTE Diez        Subjective:     Principal Problem:COPD exacerbation        HPI:  The patient is a 58 y/o female with PMH of COPD on home O2 at 2L who presents with a one week history of worsening SOB. Symptom similar to her previous COPD flares. She reports associated low grade temps in the 99s and 100s and a non-productive cough. She finally could not handle the symptoms any longer as she was not feeling well overall and then decided to come to the ER. She denies any chest pain, nausea, vomiting, diarrhea, dysuria, sick contact exposures or other symptoms.    Overview/Hospital Course:  12/27/2019:  Patient seen and examined.  Patient 59-year-old who presented with complaints of shortness of breath that she states has been going on for least 1 week and probably 2 weeks.  Patient states that she began to feel short of breath and cough approximately 1 week ago and it worsened throughout the week until presentation to the emergency department when she states it became worse and precipitated her visit to the emergency department for evaluation.  Patient has had no fevers overnight but she states she has been having low-grade temps in the  range with nonproductive cough over the last 1 week at home patient has a past medical history of coronary artery disease, anxiety, COPD, and hypertension    12/28/2019:  Patient reports that she had an exacerbation of her COPD last night.  She reports increased shortness of breath and cough.  This morning she is feeling fatigued but her breathing has improved.  Patient has been afebrile overnight.  She is currently on 2 L of nasal cannula oxygen which is consistent with her home use.  Her  white blood cell count has increased to 13.69 but she has also been on high-dose steroids.  Chest x-ray shows a Masslike pulmonary infiltrate of the right upper lobe and it was recommended to get a CT of the chest to further evaluate.        Review of Systems   Constitutional: Positive for fatigue. Negative for activity change, appetite change and fever.   HENT: Negative for congestion, ear discharge, mouth sores, nosebleeds, rhinorrhea, sinus pressure, sinus pain and tinnitus.    Eyes: Negative.  Negative for pain, redness and itching.   Respiratory: Positive for chest tightness, shortness of breath and wheezing. Negative for apnea, cough, choking and stridor.    Cardiovascular: Negative for chest pain, palpitations and leg swelling.   Gastrointestinal: Negative for abdominal distention, abdominal pain, anal bleeding, blood in stool, constipation and diarrhea.   Endocrine: Negative.    Genitourinary: Negative for difficulty urinating, flank pain, frequency and urgency.   Musculoskeletal: Positive for arthralgias and myalgias. Negative for back pain and gait problem.   Skin: Negative for color change and pallor.   Allergic/Immunologic: Negative.      Objective:     Vital Signs (Most Recent):  Temp: 97.5 °F (36.4 °C) (12/28/19 0428)  Pulse: 93 (12/28/19 0820)  Resp: 16 (12/28/19 0820)  BP: (!) 117/58 (12/28/19 0719)  SpO2: 100 % (12/28/19 0820) Vital Signs (24h Range):  Temp:  [96.5 °F (35.8 °C)-97.8 °F (36.6 °C)] 97.5 °F (36.4 °C)  Pulse:  [] 93  Resp:  [16-20] 16  SpO2:  [95 %-100 %] 100 %  BP: (108-155)/(53-67) 117/58     Weight: 68.9 kg (151 lb 14.4 oz)  Body mass index is 27.78 kg/m².    Intake/Output Summary (Last 24 hours) at 12/28/2019 0940  Last data filed at 12/28/2019 0900  Gross per 24 hour   Intake 1320 ml   Output --   Net 1320 ml      Physical Exam   Constitutional: She is oriented to person, place, and time. She appears well-developed and well-nourished. No distress.   HENT:   Head:  Normocephalic and atraumatic.   Eyes: Pupils are equal, round, and reactive to light. EOM are normal.   Neck: Normal range of motion. Neck supple. No tracheal deviation present. No thyromegaly present.   Cardiovascular: Normal rate, regular rhythm and normal heart sounds.   Pulmonary/Chest: No respiratory distress. She has wheezes. She has rales.   Abdominal: Soft. Bowel sounds are normal. She exhibits no distension. There is no tenderness. There is no rebound and no guarding.   Musculoskeletal: Normal range of motion.   Lymphadenopathy:     She has no cervical adenopathy.   Neurological: She is alert and oriented to person, place, and time. She exhibits normal muscle tone.   Skin: Skin is warm and dry. Capillary refill takes less than 2 seconds.   Psychiatric: She has a normal mood and affect. Her behavior is normal. Judgment and thought content normal.   Nursing note and vitals reviewed.      Significant Labs:   BMP:   Recent Labs   Lab 12/28/19  0605   *      K 4.5      CO2 35*   BUN 17   CREATININE 0.8   CALCIUM 8.4*   MG 2.2     CBC:   Recent Labs   Lab 12/26/19  2120 12/27/19  0554 12/28/19  0605   WBC 8.96 6.74 13.69*   HGB 11.0* 10.4* 10.8*   HCT 36.2* 34.1* 35.5*    228 247       Significant Imaging: I have reviewed all pertinent imaging results/findings within the past 24 hours.      Assessment/Plan:      * COPD exacerbation  Presenting with one week history of cough and SOB that seems like her COPD flare  Afebrile currently and no leukocytosis  Continue steroids with IV solumedrol  Add levaquin for bacterial pathogens as a possible etiology  Follow up official CXR read  Continue O2 at 2L NC and titrate up if need (2L is her home dose)  Duo nebs PRN  Added breo   Appears clinically stable     12/27/2019:  1.  Patient continues to be stable and states feeling much better  Continue current medications with albuterol Atrovent nebulization treatments for  Begin empiric antibiotics  patient been started  Continue oxygen at 2 L nasal cannula.  Continue home medications as prescribed    12/28/2019:  Patient states that she had exacerbation last night but this morning she feels better  Will wean steroids to 60 mg Solu-Medrol Q 12  Follow-up chest CT given findings on chest x-ray  Continue Levaquin  Continue nasal cannula oxygen of 2 L  Continue home meds  Continue nebs    Anxiety  Continue home dose xanax daily PRN         VTE Risk Mitigation (From admission, onward)         Ordered     IP VTE LOW RISK PATIENT  Once      12/27/19 0321                      Greg Matt MD  Department of Hospital Medicine   [facility]

## 2019-12-29 PROBLEM — J44.1 COPD EXACERBATION: Status: RESOLVED | Noted: 2019-12-27 | Resolved: 2019-12-29

## 2019-12-29 LAB
ANION GAP SERPL CALC-SCNC: 6 MMOL/L (ref 8–16)
BASOPHILS # BLD AUTO: 0.01 K/UL (ref 0–0.2)
BASOPHILS NFR BLD: 0.1 % (ref 0–1.9)
BUN SERPL-MCNC: 19 MG/DL (ref 6–20)
CALCIUM SERPL-MCNC: 8.4 MG/DL (ref 8.7–10.5)
CHLORIDE SERPL-SCNC: 102 MMOL/L (ref 95–110)
CO2 SERPL-SCNC: 35 MMOL/L (ref 23–29)
CREAT SERPL-MCNC: 0.8 MG/DL (ref 0.5–1.4)
DIFFERENTIAL METHOD: ABNORMAL
EOSINOPHIL # BLD AUTO: 0 K/UL (ref 0–0.5)
EOSINOPHIL NFR BLD: 0 % (ref 0–8)
ERYTHROCYTE [DISTWIDTH] IN BLOOD BY AUTOMATED COUNT: 12.7 % (ref 11.5–14.5)
EST. GFR  (AFRICAN AMERICAN): >60 ML/MIN/1.73 M^2
EST. GFR  (NON AFRICAN AMERICAN): >60 ML/MIN/1.73 M^2
GLUCOSE SERPL-MCNC: 148 MG/DL (ref 70–110)
HCT VFR BLD AUTO: 35.4 % (ref 37–48.5)
HGB BLD-MCNC: 10.5 G/DL (ref 12–16)
IMM GRANULOCYTES # BLD AUTO: 0.08 K/UL (ref 0–0.04)
IMM GRANULOCYTES NFR BLD AUTO: 0.5 % (ref 0–0.5)
LYMPHOCYTES # BLD AUTO: 0.5 K/UL (ref 1–4.8)
LYMPHOCYTES NFR BLD: 3.3 % (ref 18–48)
MAGNESIUM SERPL-MCNC: 2.3 MG/DL (ref 1.6–2.6)
MCH RBC QN AUTO: 28.8 PG (ref 27–31)
MCHC RBC AUTO-ENTMCNC: 29.7 G/DL (ref 32–36)
MCV RBC AUTO: 97 FL (ref 82–98)
MONOCYTES # BLD AUTO: 0.2 K/UL (ref 0.3–1)
MONOCYTES NFR BLD: 1.4 % (ref 4–15)
NEUTROPHILS # BLD AUTO: 14.6 K/UL (ref 1.8–7.7)
NEUTROPHILS NFR BLD: 94.7 % (ref 38–73)
NRBC BLD-RTO: 0 /100 WBC
PHOSPHATE SERPL-MCNC: 2.7 MG/DL (ref 2.7–4.5)
PLATELET # BLD AUTO: 273 K/UL (ref 150–350)
PMV BLD AUTO: 11.2 FL (ref 9.2–12.9)
POTASSIUM SERPL-SCNC: 4.8 MMOL/L (ref 3.5–5.1)
RBC # BLD AUTO: 3.64 M/UL (ref 4–5.4)
SODIUM SERPL-SCNC: 143 MMOL/L (ref 136–145)
WBC # BLD AUTO: 15.4 K/UL (ref 3.9–12.7)

## 2019-12-29 PROCEDURE — 63600175 PHARM REV CODE 636 W HCPCS: Performed by: FAMILY MEDICINE

## 2019-12-29 PROCEDURE — 99226 PR SUBSEQUENT OBSERVATION CARE,LEVEL III: CPT | Mod: ,,, | Performed by: FAMILY MEDICINE

## 2019-12-29 PROCEDURE — 80048 BASIC METABOLIC PNL TOTAL CA: CPT

## 2019-12-29 PROCEDURE — 99226 PR SUBSEQUENT OBSERVATION CARE,LEVEL III: ICD-10-PCS | Mod: ,,, | Performed by: FAMILY MEDICINE

## 2019-12-29 PROCEDURE — 83735 ASSAY OF MAGNESIUM: CPT

## 2019-12-29 PROCEDURE — 27000221 HC OXYGEN, UP TO 24 HOURS

## 2019-12-29 PROCEDURE — 96374 THER/PROPH/DIAG INJ IV PUSH: CPT | Performed by: INTERNAL MEDICINE

## 2019-12-29 PROCEDURE — 84100 ASSAY OF PHOSPHORUS: CPT

## 2019-12-29 PROCEDURE — 25000003 PHARM REV CODE 250: Performed by: FAMILY MEDICINE

## 2019-12-29 PROCEDURE — 25000003 PHARM REV CODE 250: Performed by: HOSPITALIST

## 2019-12-29 PROCEDURE — 25000003 PHARM REV CODE 250: Performed by: INTERNAL MEDICINE

## 2019-12-29 PROCEDURE — 25000242 PHARM REV CODE 250 ALT 637 W/ HCPCS: Performed by: HOSPITALIST

## 2019-12-29 PROCEDURE — 96376 TX/PRO/DX INJ SAME DRUG ADON: CPT | Performed by: INTERNAL MEDICINE

## 2019-12-29 PROCEDURE — 94761 N-INVAS EAR/PLS OXIMETRY MLT: CPT

## 2019-12-29 PROCEDURE — 85025 COMPLETE CBC W/AUTO DIFF WBC: CPT

## 2019-12-29 PROCEDURE — 94640 AIRWAY INHALATION TREATMENT: CPT

## 2019-12-29 PROCEDURE — 36415 COLL VENOUS BLD VENIPUNCTURE: CPT

## 2019-12-29 PROCEDURE — G0378 HOSPITAL OBSERVATION PER HR: HCPCS

## 2019-12-29 RX ORDER — PANTOPRAZOLE SODIUM 40 MG/1
40 TABLET, DELAYED RELEASE ORAL DAILY
Qty: 30 TABLET | Refills: 11 | Status: SHIPPED | OUTPATIENT
Start: 2019-12-30 | End: 2020-01-01

## 2019-12-29 RX ORDER — LEVOFLOXACIN 750 MG/1
750 TABLET ORAL DAILY
Qty: 4 TABLET | Refills: 0 | Status: SHIPPED | OUTPATIENT
Start: 2019-12-30 | End: 2020-01-03

## 2019-12-29 RX ORDER — ONDANSETRON 4 MG/1
4 TABLET, ORALLY DISINTEGRATING ORAL EVERY 6 HOURS PRN
Qty: 20 TABLET | Refills: 0 | Status: SHIPPED | OUTPATIENT
Start: 2019-12-29

## 2019-12-29 RX ORDER — PREDNISONE 10 MG/1
60 TABLET ORAL DAILY
Status: DISCONTINUED | OUTPATIENT
Start: 2019-12-29 | End: 2019-12-30 | Stop reason: HOSPADM

## 2019-12-29 RX ORDER — FLUTICASONE FUROATE AND VILANTEROL 100; 25 UG/1; UG/1
1 POWDER RESPIRATORY (INHALATION) DAILY
Qty: 1 EACH | Refills: 5 | Status: SHIPPED | OUTPATIENT
Start: 2019-12-30

## 2019-12-29 RX ORDER — AMOXICILLIN 250 MG
1 CAPSULE ORAL 2 TIMES DAILY PRN
Status: DISCONTINUED | OUTPATIENT
Start: 2019-12-29 | End: 2019-12-30 | Stop reason: HOSPADM

## 2019-12-29 RX ORDER — IPRATROPIUM BROMIDE AND ALBUTEROL SULFATE 2.5; .5 MG/3ML; MG/3ML
3 SOLUTION RESPIRATORY (INHALATION) EVERY 4 HOURS PRN
Qty: 1 BOX | Refills: 0 | Status: SHIPPED | OUTPATIENT
Start: 2019-12-29 | End: 2020-01-01

## 2019-12-29 RX ORDER — PREDNISONE 20 MG/1
TABLET ORAL
Qty: 18 TABLET | Refills: 0 | Status: SHIPPED | OUTPATIENT
Start: 2019-12-29 | End: 2020-01-07

## 2019-12-29 RX ADMIN — FLUTICASONE FUROATE AND VILANTEROL TRIFENATATE 1 PUFF: 100; 25 POWDER RESPIRATORY (INHALATION) at 07:12

## 2019-12-29 RX ADMIN — IPRATROPIUM BROMIDE AND ALBUTEROL SULFATE 3 ML: .5; 3 SOLUTION RESPIRATORY (INHALATION) at 10:12

## 2019-12-29 RX ADMIN — IPRATROPIUM BROMIDE AND ALBUTEROL SULFATE 3 ML: .5; 3 SOLUTION RESPIRATORY (INHALATION) at 04:12

## 2019-12-29 RX ADMIN — PANTOPRAZOLE SODIUM 40 MG: 40 TABLET, DELAYED RELEASE ORAL at 09:12

## 2019-12-29 RX ADMIN — LEVOFLOXACIN 750 MG: 250 TABLET, FILM COATED ORAL at 09:12

## 2019-12-29 RX ADMIN — IPRATROPIUM BROMIDE AND ALBUTEROL SULFATE 3 ML: .5; 3 SOLUTION RESPIRATORY (INHALATION) at 07:12

## 2019-12-29 RX ADMIN — SENNOSIDES AND DOCUSATE SODIUM 1 TABLET: 8.6; 5 TABLET ORAL at 09:12

## 2019-12-29 RX ADMIN — PREDNISONE 60 MG: 10 TABLET ORAL at 11:12

## 2019-12-29 NOTE — DISCHARGE SUMMARY
[facility] Hospital Medicine  Discharge Summary      Patient Name: Odessa Cm  MRN: [id number]  Admission Date: 12/26/2019  Hospital Length of Stay: 0 days  Discharge Date and Time:  12/29/2019 12:20 PM  Attending Physician: Zen Blackman MD   Discharging Provider: Greg Matt MD  Primary Care Provider: CLEMENTE Diez      HPI:   The patient is a 58 y/o female with PMH of COPD on home O2 at  who presents with a one week history of worsening SOB. Symptom similar to her previous COPD flares. She reports associated low grade temps in the 99s and 100s and a non-productive cough. She finally could not handle the symptoms any longer as she was not feeling well overall and then decided to come to the ER. She denies any chest pain, nausea, vomiting, diarrhea, dysuria, sick contact exposures or other symptoms.    * No surgery found *      Hospital Course:   12/27/2019:  Patient seen and examined.  Patient 59-year-old who presented with complaints of shortness of breath that she states has been going on for least 1 week and probably 2 weeks.  Patient states that she began to feel short of breath and cough approximately 1 week ago and it worsened throughout the week until presentation to the emergency department when she states it became worse and precipitated her visit to the emergency department for evaluation.  Patient has had no fevers overnight but she states she has been having low-grade temps in the  range with nonproductive cough over the last 1 week at home patient has a past medical history of coronary artery disease, anxiety, COPD, and hypertension    12/28/2019:  Patient reports that she had an exacerbation of her COPD last night.  She reports increased shortness of breath and cough.  This morning she is feeling fatigued but her breathing has improved.  Patient has been afebrile overnight.  She is currently on 2 L of nasal cannula oxygen which is  consistent with her home use.  Her white blood cell count has increased to 13.69 but she has also been on high-dose steroids.  Chest x-ray shows a Masslike pulmonary infiltrate of the right upper lobe and it was recommended to get a CT of the chest to further evaluate.    12/29/2019:  Patient reports that she feels better this morning.  Her shortness of breath his present when she walks.  Walking O2 test today shows a oxygen saturation of 84% on room air.  She was unable to complete the walk.  She has a home oxygen concentrator and does not leave the helps.  She does not have portable oxygen.  White blood cell count has increased today to 15 but she has been on high-dose steroids.  She has been afebrile.  CT scan done yesterday shows scarring in the lung but no mass or infiltrate.  Patient has received the maximum benefit from hospitalization and will be discharged home with close follow-up with her PCP.  Patient will complete a steroid taper and antibiotics and will receive portable oxygen before discharge.     Consults:     No new Assessment & Plan notes have been filed under this hospital service since the last note was generated.  Service: Hospital Medicine    Final Active Diagnoses:    Diagnosis Date Noted POA    Anxiety [F41.9] 07/12/2018 Yes      Problems Resolved During this Admission:    Diagnosis Date Noted Date Resolved POA    PRINCIPAL PROBLEM:  COPD exacerbation [J44.1] 12/27/2019 12/29/2019 Yes       Discharged Condition: stable    Disposition: Home or Self Care    Follow Up:  Follow-up Information     CLEMENTE Diez. Schedule an appointment as soon as possible for a visit in 1 week.    Specialty:  Family Medicine  Why:  hospital discharge follow up  Contact information:  [address] [place] [id number] [phone]                 Patient Instructions:      OXYGEN FOR HOME USE     Order Specific Question Answer Comments   Liter Flow 2    Duration Continuous   "  Qualifying SpO2: 84%    Testing done at: Rest    Route nasal cannula    Portable mode: continuous    Device home concentrator with portable unit PORTABLE UNIT, patient has home concentrator   Length of need (in months): 99 mos    Patient condition with qualifying saturation COPD    Height: 5' 2" (1.575 m)    Weight: 68.9 kg (151 lb 14.4 oz)    Does patient have medical equipment at home? nebulizer    Does patient have medical equipment at home? oxygen    Alternative treatment measures have been tried or considered and deemed clinically ineffective. Yes      Activity as tolerated       Significant Diagnostic Studies: Labs:   BMP:   Recent Labs   Lab 12/28/19  0605 12/29/19  0549   * 148*    143   K 4.5 4.8    102   CO2 35* 35*   BUN 17 19   CREATININE 0.8 0.8   CALCIUM 8.4* 8.4*   MG 2.2 2.3   , CBC   Recent Labs   Lab 12/28/19  0605 12/29/19  0549   WBC 13.69* 15.40*   HGB 10.8* 10.5*   HCT 35.5* 35.4*    273     Radiology:  CT Chest With Contrast   Final Result      1. There is increased right apical scarring which accounts for finding on recent radiograph.  No discrete mass or airspace disease.   2. Severe pulmonary emphysema.   3. Coronary artery disease.         Electronically signed by: Durga Dewey   Date:    12/28/2019   Time:    16:38      X-Ray Chest PA And Lateral   Final Result   Abnormal      1. Significant COPD with emphysematous change.   2. Masslike pulmonary infiltrate of the right upper lobe.  This is best visualized on the lateral view.  Correlate clinically with possible fever and/or elevated white count.  As deemed clinically necessary, further evaluation may be obtained with a dedicated contrast enhanced CT of the chest to exclude a suspicious pulmonary lesion.   This report was flagged in Epic as abnormal.      Examination added to the ER follow-up folder.         Electronically signed by: Jaziel Smith   Date:    12/27/2019   Time:    09:00            Pending " Diagnostic Studies:     None         Medications:  Reconciled Home Medications:      Medication List      START taking these medications    albuterol-ipratropium 2.5 mg-0.5 mg/3 mL nebulizer solution  Commonly known as:  DUO-NEB  Take 3 mLs by nebulization every 4 (four) hours as needed for Wheezing or Shortness of Breath. Rescue     fluticasone furoate-vilanterol 100-25 mcg/dose diskus inhaler  Commonly known as:  BREO  Inhale 1 puff into the lungs once daily. Controller  Start taking on:  December 30, 2019     levoFLOXacin 750 MG tablet  Commonly known as:  LEVAQUIN  Take 1 tablet (750 mg total) by mouth once daily. for 4 days  Start taking on:  December 30, 2019     ondansetron 4 MG Tbdl  Commonly known as:  ZOFRAN-ODT  Take 1 tablet (4 mg total) by mouth every 6 (six) hours as needed.     pantoprazole 40 MG tablet  Commonly known as:  PROTONIX  Take 1 tablet (40 mg total) by mouth once daily.  Start taking on:  December 30, 2019     predniSONE 20 MG tablet  Commonly known as:  DELTASONE  Take 3 tablets (60 mg total) by mouth once daily for 3 days, THEN 2 tablets (40 mg total) once daily for 3 days, THEN 1 tablet (20 mg total) once daily for 3 days.  Start taking on:  December 29, 2019        CONTINUE taking these medications    ALPRAZolam 0.5 MG tablet  Commonly known as:  XANAX  Take 0.25 mg by mouth daily as needed for Anxiety.        STOP taking these medications    albuterol 2.5 mg /3 mL (0.083 %) nebulizer solution  Commonly known as:  PROVENTIL     ipratropium 0.02 % nebulizer solution  Commonly known as:  ATROVENT            Indwelling Lines/Drains at time of discharge:   Lines/Drains/Airways     None                 Time spent on the discharge of patient: 25 minutes  Patient was seen and examined on the date of discharge and determined to be suitable for discharge.         Greg Matt MD  Department of Hospital Medicine  [facility]

## 2019-12-29 NOTE — TREATMENT PLAN
1025- o2 taken off pt to obtain a room air sat.  1052- pulse ox is 84%, hr 110 sitting on side of bed,   Pt too sob to walk. Aerosol tx givin ASAP.  PT ALREADY HAS HOME O2, BUT DOES NOT HAVE PORTABLE O2, FOR OUTSIDE OF HOME USE.

## 2019-12-29 NOTE — PROGRESS NOTES
[facility] Medicine  Progress Note    Patient Name: Odessa Cm  MRN: [id number]  Patient Class: OP- Observation   Admission Date: 12/26/2019  Length of Stay: 0 days  Attending Physician: Zen Blackman MD  Primary Care Provider: CLEMENTE Diez        Subjective:     Principal Problem:COPD exacerbation        HPI:  The patient is a 58 y/o female with PMH of COPD on home O2 at 2L who presents with a one week history of worsening SOB. Symptom similar to her previous COPD flares. She reports associated low grade temps in the 99s and 100s and a non-productive cough. She finally could not handle the symptoms any longer as she was not feeling well overall and then decided to come to the ER. She denies any chest pain, nausea, vomiting, diarrhea, dysuria, sick contact exposures or other symptoms.    Overview/Hospital Course:  12/27/2019:  Patient seen and examined.  Patient 59-year-old who presented with complaints of shortness of breath that she states has been going on for least 1 week and probably 2 weeks.  Patient states that she began to feel short of breath and cough approximately 1 week ago and it worsened throughout the week until presentation to the emergency department when she states it became worse and precipitated her visit to the emergency department for evaluation.  Patient has had no fevers overnight but she states she has been having low-grade temps in the  range with nonproductive cough over the last 1 week at home patient has a past medical history of coronary artery disease, anxiety, COPD, and hypertension    12/28/2019:  Patient reports that she had an exacerbation of her COPD last night.  She reports increased shortness of breath and cough.  This morning she is feeling fatigued but her breathing has improved.  Patient has been afebrile overnight.  She is currently on 2 L of nasal cannula oxygen which is consistent with her home use.  Her  white blood cell count has increased to 13.69 but she has also been on high-dose steroids.  Chest x-ray shows a Masslike pulmonary infiltrate of the right upper lobe and it was recommended to get a CT of the chest to further evaluate.    12/29/2019:  Patient reports that she feels better this morning.  Her shortness of breath his present when she walks.  Walking O2 test today shows a oxygen saturation of 84% on room air.  She was unable to complete the walk.  She has a home oxygen concentrator and does not leave the house.  She does not have portable oxygen.  White blood cell count has increased today to 15 but she has been on high-dose steroids.  She has been afebrile.  CT scan done yesterday shows scarring in the lung but no mass or infiltrate.  Patient has received the maximum benefit from hospitalization and will be discharged home with close follow-up with her PCP.  Patient will complete a steroid taper and antibiotics and will receive portable oxygen before discharge.     **unable to obtain portable O2 secondary to insurance issue. Unsafe for discharge today so will hold discharge until portable O2 can be obtained.      Review of Systems   Constitutional: Positive for fatigue. Negative for activity change, appetite change and fever.   HENT: Negative for congestion, ear discharge, mouth sores, nosebleeds, rhinorrhea, sinus pressure, sinus pain and tinnitus.    Eyes: Negative.  Negative for pain, redness and itching.   Respiratory: Positive for chest tightness, shortness of breath and wheezing. Negative for apnea, cough, choking and stridor.    Cardiovascular: Negative for chest pain, palpitations and leg swelling.   Gastrointestinal: Negative for abdominal distention, abdominal pain, anal bleeding, blood in stool, constipation and diarrhea.   Endocrine: Negative.    Genitourinary: Negative for difficulty urinating, flank pain, frequency and urgency.   Musculoskeletal: Positive for arthralgias and myalgias.  Negative for back pain and gait problem.   Skin: Negative for color change and pallor.   Allergic/Immunologic: Negative.       Objective:      Vital Signs (Most Recent):  Temp: 97.5 °F (36.4 °C) (12/28/19 0428)  Pulse: 93 (12/28/19 0820)  Resp: 16 (12/28/19 0820)  BP: (!) 117/58 (12/28/19 0719)  SpO2: 100 % (12/28/19 0820) Vital Signs (24h Range):  Temp:  [96.5 °F (35.8 °C)-97.8 °F (36.6 °C)] 97.5 °F (36.4 °C)  Pulse:  [] 93  Resp:  [16-20] 16  SpO2:  [95 %-100 %] 100 %  BP: (108-155)/(53-67) 117/58      Weight: 68.9 kg (151 lb 14.4 oz)  Body mass index is 27.78 kg/m².     Intake/Output Summary (Last 24 hours) at 12/28/2019 0940  Last data filed at 12/28/2019 0900      Gross per 24 hour   Intake 1320 ml   Output --   Net 1320 ml      Physical Exam   Constitutional: She is oriented to person, place, and time. She appears well-developed and well-nourished. No distress.   HENT:   Head: Normocephalic and atraumatic.   Eyes: Pupils are equal, round, and reactive to light. EOM are normal.   Neck: Normal range of motion. Neck supple. No tracheal deviation present. No thyromegaly present.   Cardiovascular: Normal rate, regular rhythm and normal heart sounds.   Pulmonary/Chest: No respiratory distress. She has wheezes. She has rales.   Abdominal: Soft. Bowel sounds are normal. She exhibits no distension. There is no tenderness. There is no rebound and no guarding.   Musculoskeletal: Normal range of motion.   Lymphadenopathy:     She has no cervical adenopathy.   Neurological: She is alert and oriented to person, place, and time. She exhibits normal muscle tone.   Skin: Skin is warm and dry. Capillary refill takes less than 2 seconds.   Psychiatric: She has a normal mood and affect. Her behavior is normal. Judgment and thought content normal.   Nursing note and vitals reviewed.        Significant Labs:   BMP:       Recent Labs   Lab 12/28/19  0605   *      K 4.5      CO2 35*   BUN 17   CREATININE 0.8    CALCIUM 8.4*   MG 2.2      CBC:         Recent Labs   Lab 12/26/19  2120 12/27/19  0554 12/28/19  0605   WBC 8.96 6.74 13.69*   HGB 11.0* 10.4* 10.8*   HCT 36.2* 34.1* 35.5*    228 247         Significant Imaging: I have reviewed all pertinent imaging results/findings within the past 24 hours.      Assessment/Plan:      * COPD exacerbation  Presenting with one week history of cough and SOB that seems like her COPD flare  Afebrile currently and no leukocytosis  Continue steroids with IV solumedrol  Add levaquin for bacterial pathogens as a possible etiology  Follow up official CXR read  Continue O2 at 2L NC and titrate up if need (2L is her home dose)  Duo nebs PRN  Added breo   Appears clinically stable     12/27/2019:  1.  Patient continues to be stable and states feeling much better  Continue current medications with albuterol Atrovent nebulization treatments for  Begin empiric antibiotics patient been started  Continue oxygen at 2 L nasal cannula.  Continue home medications as prescribed    12/28/2019:  Patient states that she had exacerbation last night but this morning she feels better  Will wean steroids to 60 mg Solu-Medrol Q 12  Follow-up chest CT given findings on chest x-ray  Continue Levaquin  Continue nasal cannula oxygen of 2 L  Continue home meds  Continue nebs    12/29/19  CT scan done yesterday shows scarring in the lung but no mass or infiltrate.    Walking O2 test with 84% O2 sat on room air.     **unable to obtain portable O2 secondary to insurance issue. Unsafe for discharge today so will hold discharge until portable O2 can be obtained.    Anxiety  Continue home dose xanax daily PRN         VTE Risk Mitigation (From admission, onward)         Ordered     IP VTE LOW RISK PATIENT  Once      12/27/19 0321                      Greg Matt MD  Department of Hospital Medicine   [facility]

## 2019-12-29 NOTE — NURSING
Srinath Paz who is on call, stated that they needed prior authorization from insurance company to see if the insurance company will pay for portable oxygen. Md cancelled discharge until they could get the portable home oxygen. Patient has home oxygen concentrator but doesn't have portable to make it home.

## 2019-12-29 NOTE — ASSESSMENT & PLAN NOTE
Presenting with one week history of cough and SOB that seems like her COPD flare  Afebrile currently and no leukocytosis  Continue steroids with IV solumedrol  Add levaquin for bacterial pathogens as a possible etiology  Follow up official CXR read  Continue O2 at 2L NC and titrate up if need (2L is her home dose)  Duo nebs PRN  Added breo   Appears clinically stable     12/27/2019:  1.  Patient continues to be stable and states feeling much better  Continue current medications with albuterol Atrovent nebulization treatments for  Begin empiric antibiotics patient been started  Continue oxygen at 2 L nasal cannula.  Continue home medications as prescribed    12/28/2019:  Patient states that she had exacerbation last night but this morning she feels better  Will wean steroids to 60 mg Solu-Medrol Q 12  Follow-up chest CT given findings on chest x-ray  Continue Levaquin  Continue nasal cannula oxygen of 2 L  Continue home meds  Continue nebs    12/29/19  CT scan done yesterday shows scarring in the lung but no mass or infiltrate.    Walking O2 test with 84% O2 sat on room air.     **unable to obtain portable O2 secondary to insurance issue. Unsafe for discharge today so will hold discharge until portable O2 can be obtained.

## 2019-12-29 NOTE — PLAN OF CARE
Patient resting comfortably throughout shift.  Less SOB on exertion noted.  VSS, afebrile, NADN.  Call light within reach, family member at bedside.

## 2019-12-30 VITALS
SYSTOLIC BLOOD PRESSURE: 136 MMHG | BODY MASS INDEX: 27.95 KG/M2 | RESPIRATION RATE: 16 BRPM | WEIGHT: 151.88 LBS | OXYGEN SATURATION: 100 % | HEIGHT: 62 IN | DIASTOLIC BLOOD PRESSURE: 61 MMHG | TEMPERATURE: 97 F | HEART RATE: 80 BPM

## 2019-12-30 LAB
ANION GAP SERPL CALC-SCNC: 2 MMOL/L (ref 8–16)
BASOPHILS # BLD AUTO: 0.01 K/UL (ref 0–0.2)
BASOPHILS NFR BLD: 0.1 % (ref 0–1.9)
BUN SERPL-MCNC: 15 MG/DL (ref 6–20)
CALCIUM SERPL-MCNC: 7.9 MG/DL (ref 8.7–10.5)
CHLORIDE SERPL-SCNC: 102 MMOL/L (ref 95–110)
CO2 SERPL-SCNC: 37 MMOL/L (ref 23–29)
CREAT SERPL-MCNC: 0.7 MG/DL (ref 0.5–1.4)
DIFFERENTIAL METHOD: ABNORMAL
EOSINOPHIL # BLD AUTO: 0 K/UL (ref 0–0.5)
EOSINOPHIL NFR BLD: 0 % (ref 0–8)
ERYTHROCYTE [DISTWIDTH] IN BLOOD BY AUTOMATED COUNT: 12.7 % (ref 11.5–14.5)
EST. GFR  (AFRICAN AMERICAN): >60 ML/MIN/1.73 M^2
EST. GFR  (NON AFRICAN AMERICAN): >60 ML/MIN/1.73 M^2
GLUCOSE SERPL-MCNC: 102 MG/DL (ref 70–110)
HCT VFR BLD AUTO: 34.9 % (ref 37–48.5)
HGB BLD-MCNC: 10.3 G/DL (ref 12–16)
IMM GRANULOCYTES # BLD AUTO: 0.09 K/UL (ref 0–0.04)
IMM GRANULOCYTES NFR BLD AUTO: 0.8 % (ref 0–0.5)
LYMPHOCYTES # BLD AUTO: 1.6 K/UL (ref 1–4.8)
LYMPHOCYTES NFR BLD: 14 % (ref 18–48)
MAGNESIUM SERPL-MCNC: 2.3 MG/DL (ref 1.6–2.6)
MCH RBC QN AUTO: 28.5 PG (ref 27–31)
MCHC RBC AUTO-ENTMCNC: 29.5 G/DL (ref 32–36)
MCV RBC AUTO: 96 FL (ref 82–98)
MONOCYTES # BLD AUTO: 0.7 K/UL (ref 0.3–1)
MONOCYTES NFR BLD: 6.5 % (ref 4–15)
NEUTROPHILS # BLD AUTO: 8.8 K/UL (ref 1.8–7.7)
NEUTROPHILS NFR BLD: 78.6 % (ref 38–73)
NRBC BLD-RTO: 0 /100 WBC
PHOSPHATE SERPL-MCNC: 2.5 MG/DL (ref 2.7–4.5)
PLATELET # BLD AUTO: 241 K/UL (ref 150–350)
PMV BLD AUTO: 10.1 FL (ref 9.2–12.9)
POTASSIUM SERPL-SCNC: 3.9 MMOL/L (ref 3.5–5.1)
RBC # BLD AUTO: 3.62 M/UL (ref 4–5.4)
SODIUM SERPL-SCNC: 141 MMOL/L (ref 136–145)
WBC # BLD AUTO: 11.24 K/UL (ref 3.9–12.7)

## 2019-12-30 PROCEDURE — 25000242 PHARM REV CODE 250 ALT 637 W/ HCPCS: Performed by: HOSPITALIST

## 2019-12-30 PROCEDURE — 85025 COMPLETE CBC W/AUTO DIFF WBC: CPT

## 2019-12-30 PROCEDURE — 94761 N-INVAS EAR/PLS OXIMETRY MLT: CPT

## 2019-12-30 PROCEDURE — 94640 AIRWAY INHALATION TREATMENT: CPT

## 2019-12-30 PROCEDURE — 36415 COLL VENOUS BLD VENIPUNCTURE: CPT

## 2019-12-30 PROCEDURE — 63600175 PHARM REV CODE 636 W HCPCS: Performed by: FAMILY MEDICINE

## 2019-12-30 PROCEDURE — 80048 BASIC METABOLIC PNL TOTAL CA: CPT

## 2019-12-30 PROCEDURE — 99217 PR OBSERVATION CARE DISCHARGE: ICD-10-PCS | Mod: ,,, | Performed by: INTERNAL MEDICINE

## 2019-12-30 PROCEDURE — 99217 PR OBSERVATION CARE DISCHARGE: CPT | Mod: ,,, | Performed by: INTERNAL MEDICINE

## 2019-12-30 PROCEDURE — 25000003 PHARM REV CODE 250: Performed by: INTERNAL MEDICINE

## 2019-12-30 PROCEDURE — 84100 ASSAY OF PHOSPHORUS: CPT

## 2019-12-30 PROCEDURE — 27000221 HC OXYGEN, UP TO 24 HOURS

## 2019-12-30 PROCEDURE — 83735 ASSAY OF MAGNESIUM: CPT

## 2019-12-30 PROCEDURE — 25000003 PHARM REV CODE 250: Performed by: FAMILY MEDICINE

## 2019-12-30 PROCEDURE — G0378 HOSPITAL OBSERVATION PER HR: HCPCS

## 2019-12-30 RX ADMIN — IPRATROPIUM BROMIDE AND ALBUTEROL SULFATE 3 ML: .5; 3 SOLUTION RESPIRATORY (INHALATION) at 11:12

## 2019-12-30 RX ADMIN — LEVOFLOXACIN 750 MG: 250 TABLET, FILM COATED ORAL at 08:12

## 2019-12-30 RX ADMIN — PANTOPRAZOLE SODIUM 40 MG: 40 TABLET, DELAYED RELEASE ORAL at 08:12

## 2019-12-30 RX ADMIN — FLUTICASONE FUROATE AND VILANTEROL TRIFENATATE 1 PUFF: 100; 25 POWDER RESPIRATORY (INHALATION) at 07:12

## 2019-12-30 RX ADMIN — IPRATROPIUM BROMIDE AND ALBUTEROL SULFATE 3 ML: .5; 3 SOLUTION RESPIRATORY (INHALATION) at 07:12

## 2019-12-30 RX ADMIN — PREDNISONE 60 MG: 10 TABLET ORAL at 08:12

## 2019-12-30 NOTE — DISCHARGE SUMMARY
[facility] Medicine  Discharge Summary      Patient Name: Odessa Cm  MRN: [id number]  Admission Date: 12/26/2019  Hospital Length of Stay: 0 days  Discharge Date and Time:  12/30/2019 1:42 PM  Attending Physician: Zen Blackman MD   Discharging Provider: Zen Blackman MD  Primary Care Provider: CLEMENTE Diez      HPI:   The patient is a 60 y/o female with PMH of COPD on home O2 at 2L who presents with a one week history of worsening SOB. Symptom similar to her previous COPD flares. She reports associated low grade temps in the 99s and 100s and a non-productive cough. She finally could not handle the symptoms any longer as she was not feeling well overall and then decided to come to the ER. She denies any chest pain, nausea, vomiting, diarrhea, dysuria, sick contact exposures or other symptoms.    * No surgery found *      Hospital Course:   12/27/2019:  Patient seen and examined.  Patient 59-year-old who presented with complaints of shortness of breath that she states has been going on for least 1 week and probably 2 weeks.  Patient states that she began to feel short of breath and cough approximately 1 week ago and it worsened throughout the week until presentation to the emergency department when she states it became worse and precipitated her visit to the emergency department for evaluation.  Patient has had no fevers overnight but she states she has been having low-grade temps in the  range with nonproductive cough over the last 1 week at home patient has a past medical history of coronary artery disease, anxiety, COPD, and hypertension    12/28/2019:  Patient reports that she had an exacerbation of her COPD last night.  She reports increased shortness of breath and cough.  This morning she is feeling fatigued but her breathing has improved.  Patient has been afebrile overnight.  She is currently on 2 L of nasal cannula oxygen which is  consistent with her home use.  Her white blood cell count has increased to 13.69 but she has also been on high-dose steroids.  Chest x-ray shows a Masslike pulmonary infiltrate of the right upper lobe and it was recommended to get a CT of the chest to further evaluate.    12/29/2019:  Patient reports that she feels better this morning.  Her shortness of breath his present when she walks.  Walking O2 test today shows a oxygen saturation of 84% on room air.  She was unable to complete the walk.  She has a home oxygen concentrator and does not leave the house.  She does not have portable oxygen.  White blood cell count has increased today to 15 but she has been on high-dose steroids.  She has been afebrile.  CT scan done yesterday shows scarring in the lung but no mass or infiltrate.  Patient has received the maximum benefit from hospitalization and will be discharged home with close follow-up with her PCP.  Patient will complete a steroid taper and antibiotics and will receive portable oxygen before discharge.     **unable to obtain portable O2 secondary to insurance issue. Unsafe for discharge today so will hold discharge until portable O2 can be obtained.    12/30/2019:  Patient able to have oxygen delivered this morning.  For oxygen has been delivered patient ready for discharge. Prescriptions were called to [facility] yesterday and patient otherwise is stable for discharge. I agree with the discharge medications as listed and steroid taper.  Patient to follow with her primary care physician 1 week     Consults:     * COPD exacerbation  Presenting with one week history of cough and SOB that seems like her COPD flare  Afebrile currently and no leukocytosis  Continue steroids with IV solumedrol  Add levaquin for bacterial pathogens as a possible etiology  Follow up official CXR read  Continue O2 at 2L NC and titrate up if need (2L is her home dose)  Duo nebs PRN  Added breo   Appears clinically stable      12/30/2019:  1.  Discharge to home  2.  Follow up with primary care physician within 1 week post discharge  3.  Continue home medications in addition to prescriptions that are listed above  4.  Follow-up otherwise as needed or if worsening as soon as possible    12/27/2019:  1.  Patient continues to be stable and states feeling much better  Continue current medications with albuterol Atrovent nebulization treatments for  Begin empiric antibiotics patient been started  Continue oxygen at 2 L nasal cannula.  Continue home medications as prescribed    12/28/2019:  Patient states that she had exacerbation last night but this morning she feels better  Will wean steroids to 60 mg Solu-Medrol Q 12  Follow-up chest CT given findings on chest x-ray  Continue Levaquin  Continue nasal cannula oxygen of 2 L  Continue home meds  Continue nebs    12/29/19  CT scan done yesterday shows scarring in the lung but no mass or infiltrate.    Walking O2 test with 84% O2 sat on room air.     **unable to obtain portable O2 secondary to insurance issue. Unsafe for discharge today so will hold discharge until portable O2 can be obtained.      Final Active Diagnoses:    Diagnosis Date Noted POA    PRINCIPAL PROBLEM:  COPD exacerbation [J44.1] 12/27/2019 Yes    Anxiety [F41.9] 07/12/2018 Yes      Problems Resolved During this Admission:       Discharged Condition: good    Disposition: Home or Self Care    Follow Up:  Follow-up Information     CLEMENTE Diez. Go on 1/6/2020.    Specialty:  Family Medicine  Why:  appointment time: 1:15pm for hospital discharge follow up  Contact information:  [address] [facility] [address] [phone]                 Patient Instructions:      OXYGEN FOR HOME USE     Order Specific Question Answer Comments   Liter Flow 2    Duration Continuous    Qualifying SpO2: 84%    Testing done at: Rest    Route nasal cannula    Portable mode: continuous    Device home concentrator  "with portable unit PORTABLE UNIT, patient has home concentrator   Length of need (in months): 99 mos    Patient condition with qualifying saturation COPD    Height: 5' 2" (1.575 m)    Weight: 68.9 kg (151 lb 14.4 oz)    Does patient have medical equipment at home? nebulizer    Does patient have medical equipment at home? oxygen    Alternative treatment measures have been tried or considered and deemed clinically ineffective. Yes      Activity as tolerated       Significant Diagnostic Studies: Labs: All labs within the past 24 hours have been reviewed    Pending Diagnostic Studies:     None         Medications:  Reconciled Home Medications:      Medication List      START taking these medications    albuterol-ipratropium 2.5 mg-0.5 mg/3 mL nebulizer solution  Commonly known as:  DUO-NEB  Take 3 mLs by nebulization every 4 (four) hours as needed for Wheezing or Shortness of Breath. Rescue     fluticasone furoate-vilanterol 100-25 mcg/dose diskus inhaler  Commonly known as:  BREO  Inhale 1 puff into the lungs once daily. Controller     levoFLOXacin 750 MG tablet  Commonly known as:  LEVAQUIN  Take 1 tablet (750 mg total) by mouth once daily. for 4 days     ondansetron 4 MG Tbdl  Commonly known as:  ZOFRAN-ODT  Take 1 tablet (4 mg total) by mouth every 6 (six) hours as needed.     pantoprazole 40 MG tablet  Commonly known as:  PROTONIX  Take 1 tablet (40 mg total) by mouth once daily.     predniSONE 20 MG tablet  Commonly known as:  DELTASONE  Take 3 tablets (60 mg total) by mouth once daily for 3 days, THEN 2 tablets (40 mg total) once daily for 3 days, THEN 1 tablet (20 mg total) once daily for 3 days.  Start taking on:  December 29, 2019        CONTINUE taking these medications    ALPRAZolam 0.5 MG tablet  Commonly known as:  XANAX  Take 0.25 mg by mouth daily as needed for Anxiety.        STOP taking these medications    albuterol 2.5 mg /3 mL (0.083 %) nebulizer solution  Commonly known as:  PROVENTIL   "   ipratropium 0.02 % nebulizer solution  Commonly known as:  ATROVENT            Indwelling Lines/Drains at time of discharge:   Lines/Drains/Airways     None                 Time spent on the discharge of patient: 35 minutes  Patient was seen and examined on the date of discharge and determined to be suitable for discharge.         Zen Blackman MD  Department of Hospital Medicine  [facility]

## 2019-12-30 NOTE — NURSING
Reviewed medications, follow up appointments and follow up care with patient and family. Both verbalized understanding with teach back. Patient to front of hospital via wheelchair with home o2 in use. No complaints of pain. No signs of distress noted. Ab,rn

## 2019-12-30 NOTE — PLAN OF CARE
12/30/19 0815   Discharge Reassessment   Assessment Type Discharge Planning Reassessment   Anticipated Discharge Disposition Home   Provided patient/caregiver education on the expected discharge date and the discharge plan Yes   Do you have any problems affording any of your prescribed medications? No   Discharge Plan A Home with family   DME Needed Upon Discharge  none   Patient sitting on side of bed; waiting for oxygen to be delivered. States has had oxygen for about 2-3years that Cristela Doherty NP set up for her but they never gave her a portable oxygen tank. Will assist with getting one from [facility] [place]. Denies any other needs at this time.

## 2019-12-30 NOTE — NURSING
Patient c/o abdominal discomfort and requested a stool softener.  Dr MONICA Liz notified.  New orders received.

## 2019-12-30 NOTE — NURSING
Iv removed from patient at this time per d/c order. Cath intact. Pt tolerated well. Tele removed at this time and returned to tele tech. No complaints of pain. No signs of distress noted. Will continue to monitor pt. AB,RN

## 2019-12-30 NOTE — PLAN OF CARE
12/30/19 1226   Final Note   Assessment Type Final Discharge Note   Anticipated Discharge Disposition Home   What phone number can be called within the next 1-3 days to see how you are doing after discharge? [phone]   Hospital Follow Up  Appt(s) scheduled? Yes   Discharge plans and expectations educations in teach back method with documentation complete? Yes   Portable oxygen in room. Verbal & written follow up appointment provided to patient & niece. Demonstrated understanding by verbal feedback. Also instructed that when she sees Cristela Doherty to ask about ordering portable concentrator as Srinath stated it needed to be ordered by her PCP. Denies any other needs at this time.

## 2019-12-30 NOTE — ASSESSMENT & PLAN NOTE
Presenting with one week history of cough and SOB that seems like her COPD flare  Afebrile currently and no leukocytosis  Continue steroids with IV solumedrol  Add levaquin for bacterial pathogens as a possible etiology  Follow up official CXR read  Continue O2 at 2L NC and titrate up if need (2L is her home dose)  Duo nebs PRN  Added breo   Appears clinically stable     12/30/2019:  1.  Discharge to home  2.  Follow up with primary care physician within 1 week post discharge  3.  Continue home medications in addition to prescriptions that are listed above  4.  Follow-up otherwise as needed or if worsening as soon as possible    12/27/2019:  1.  Patient continues to be stable and states feeling much better  Continue current medications with albuterol Atrovent nebulization treatments for  Begin empiric antibiotics patient been started  Continue oxygen at 2 L nasal cannula.  Continue home medications as prescribed    12/28/2019:  Patient states that she had exacerbation last night but this morning she feels better  Will wean steroids to 60 mg Solu-Medrol Q 12  Follow-up chest CT given findings on chest x-ray  Continue Levaquin  Continue nasal cannula oxygen of 2 L  Continue home meds  Continue nebs    12/29/19  CT scan done yesterday shows scarring in the lung but no mass or infiltrate.    Walking O2 test with 84% O2 sat on room air.     **unable to obtain portable O2 secondary to insurance issue. Unsafe for discharge today so will hold discharge until portable O2 can be obtained.

## 2020-01-01 ENCOUNTER — LAB VISIT (OUTPATIENT)
Dept: LAB | Facility: HOSPITAL | Age: 60
End: 2020-01-01
Attending: INTERNAL MEDICINE
Payer: MEDICARE

## 2020-01-01 ENCOUNTER — HOSPITAL ENCOUNTER (OUTPATIENT)
Facility: HOSPITAL | Age: 60
Discharge: HOME | End: 2020-10-29
Attending: EMERGENCY MEDICINE | Admitting: FAMILY MEDICINE
Payer: MEDICARE

## 2020-01-01 ENCOUNTER — TELEPHONE (OUTPATIENT)
Dept: TRANSPLANT | Facility: CLINIC | Age: 60
End: 2020-01-01

## 2020-01-01 VITALS
WEIGHT: 164.81 LBS | RESPIRATION RATE: 18 BRPM | HEIGHT: 62 IN | BODY MASS INDEX: 30.33 KG/M2 | DIASTOLIC BLOOD PRESSURE: 60 MMHG | TEMPERATURE: 97 F | OXYGEN SATURATION: 99 % | SYSTOLIC BLOOD PRESSURE: 133 MMHG | HEART RATE: 98 BPM

## 2020-01-01 DIAGNOSIS — Z76.82 LUNG TRANSPLANT CANDIDATE: ICD-10-CM

## 2020-01-01 DIAGNOSIS — J44.1 COPD WITH ACUTE EXACERBATION: Primary | ICD-10-CM

## 2020-01-01 DIAGNOSIS — R06.00 DYSPNEA: ICD-10-CM

## 2020-01-01 DIAGNOSIS — F41.9 ANXIETY HYPERVENTILATION: ICD-10-CM

## 2020-01-01 DIAGNOSIS — Z99.81 SUPPLEMENTAL OXYGEN DEPENDENT: ICD-10-CM

## 2020-01-01 DIAGNOSIS — J43.9 EMPHYSEMATOUS BLEB: ICD-10-CM

## 2020-01-01 DIAGNOSIS — R91.1 PULMONARY NODULE: ICD-10-CM

## 2020-01-01 DIAGNOSIS — J44.9 CHRONIC OBSTRUCTIVE PULMONARY DISEASE, UNSPECIFIED COPD TYPE: Primary | ICD-10-CM

## 2020-01-01 DIAGNOSIS — Z51.81 ENCOUNTER FOR THERAPEUTIC DRUG MONITORING: ICD-10-CM

## 2020-01-01 DIAGNOSIS — Z59.12 HAS NO ELECTRICITY IN HOME: ICD-10-CM

## 2020-01-01 DIAGNOSIS — M81.0 SENILE OSTEOPOROSIS: ICD-10-CM

## 2020-01-01 DIAGNOSIS — F45.8 ANXIETY HYPERVENTILATION: ICD-10-CM

## 2020-01-01 DIAGNOSIS — I10 ESSENTIAL HYPERTENSION, MALIGNANT: Primary | ICD-10-CM

## 2020-01-01 LAB
ALBUMIN SERPL BCP-MCNC: 3.5 G/DL (ref 3.5–5.2)
ALBUMIN SERPL BCP-MCNC: 3.9 G/DL (ref 3.5–5.2)
ALLENS TEST: ABNORMAL
ALP SERPL-CCNC: 67 U/L (ref 55–135)
ALP SERPL-CCNC: 72 U/L (ref 55–135)
ALT SERPL W/O P-5'-P-CCNC: 14 U/L (ref 10–44)
ALT SERPL W/O P-5'-P-CCNC: 18 U/L (ref 10–44)
AMPHET+METHAMPHET UR QL: NEGATIVE
ANION GAP SERPL CALC-SCNC: 8 MMOL/L (ref 8–16)
ANION GAP SERPL CALC-SCNC: 8 MMOL/L (ref 8–16)
AST SERPL-CCNC: 18 U/L (ref 10–40)
AST SERPL-CCNC: 23 U/L (ref 10–40)
BARBITURATES UR QL SCN>200 NG/ML: NEGATIVE
BASOPHILS # BLD AUTO: 0.03 K/UL (ref 0–0.2)
BASOPHILS # BLD AUTO: 0.04 K/UL (ref 0–0.2)
BASOPHILS NFR BLD: 0.4 % (ref 0–1.9)
BASOPHILS NFR BLD: 0.5 % (ref 0–1.9)
BENZODIAZ UR QL SCN>200 NG/ML: NEGATIVE
BILIRUB SERPL-MCNC: 0.3 MG/DL (ref 0.1–1)
BILIRUB SERPL-MCNC: 0.5 MG/DL (ref 0.1–1)
BNP SERPL-MCNC: 55 PG/ML (ref 0–99)
BUN SERPL-MCNC: 10 MG/DL (ref 6–20)
BUN SERPL-MCNC: 9 MG/DL (ref 6–20)
BZE UR QL SCN: NEGATIVE
CALCIUM SERPL-MCNC: 8.6 MG/DL (ref 8.7–10.5)
CALCIUM SERPL-MCNC: 9.1 MG/DL (ref 8.7–10.5)
CANNABINOIDS UR QL SCN: NEGATIVE
CHLORIDE SERPL-SCNC: 100 MMOL/L (ref 95–110)
CHLORIDE SERPL-SCNC: 103 MMOL/L (ref 95–110)
CO2 SERPL-SCNC: 31 MMOL/L (ref 23–29)
CO2 SERPL-SCNC: 32 MMOL/L (ref 23–29)
CREAT SERPL-MCNC: 0.7 MG/DL (ref 0.5–1.4)
CREAT SERPL-MCNC: 0.8 MG/DL (ref 0.5–1.4)
CREAT UR-MCNC: 28 MG/DL (ref 15–325)
DELSYS: ABNORMAL
DIFFERENTIAL METHOD: ABNORMAL
DIFFERENTIAL METHOD: ABNORMAL
EOSINOPHIL # BLD AUTO: 0.1 K/UL (ref 0–0.5)
EOSINOPHIL # BLD AUTO: 0.2 K/UL (ref 0–0.5)
EOSINOPHIL NFR BLD: 2 % (ref 0–8)
EOSINOPHIL NFR BLD: 2.5 % (ref 0–8)
ERYTHROCYTE [DISTWIDTH] IN BLOOD BY AUTOMATED COUNT: 13.3 % (ref 11.5–14.5)
ERYTHROCYTE [DISTWIDTH] IN BLOOD BY AUTOMATED COUNT: 13.7 % (ref 11.5–14.5)
EST. GFR  (AFRICAN AMERICAN): >60 ML/MIN/1.73 M^2
EST. GFR  (AFRICAN AMERICAN): >60 ML/MIN/1.73 M^2
EST. GFR  (NON AFRICAN AMERICAN): >60 ML/MIN/1.73 M^2
EST. GFR  (NON AFRICAN AMERICAN): >60 ML/MIN/1.73 M^2
ETHANOL UR-MCNC: <10 MG/DL
GLUCOSE SERPL-MCNC: 125 MG/DL (ref 70–110)
GLUCOSE SERPL-MCNC: 98 MG/DL (ref 70–110)
HCO3 UR-SCNC: 36.4 MMOL/L (ref 24–28)
HCT VFR BLD AUTO: 39.1 % (ref 37–48.5)
HCT VFR BLD AUTO: 41.3 % (ref 37–48.5)
HGB BLD-MCNC: 11.5 G/DL (ref 12–16)
HGB BLD-MCNC: 12.4 G/DL (ref 12–16)
IMM GRANULOCYTES # BLD AUTO: 0.02 K/UL (ref 0–0.04)
IMM GRANULOCYTES # BLD AUTO: 0.03 K/UL (ref 0–0.04)
IMM GRANULOCYTES NFR BLD AUTO: 0.2 % (ref 0–0.5)
IMM GRANULOCYTES NFR BLD AUTO: 0.5 % (ref 0–0.5)
LYMPHOCYTES # BLD AUTO: 1.8 K/UL (ref 1–4.8)
LYMPHOCYTES # BLD AUTO: 2.8 K/UL (ref 1–4.8)
LYMPHOCYTES NFR BLD: 28.9 % (ref 18–48)
LYMPHOCYTES NFR BLD: 32.1 % (ref 18–48)
MCH RBC QN AUTO: 28.4 PG (ref 27–31)
MCH RBC QN AUTO: 29.7 PG (ref 27–31)
MCHC RBC AUTO-ENTMCNC: 29.4 G/DL (ref 32–36)
MCHC RBC AUTO-ENTMCNC: 30 G/DL (ref 32–36)
MCV RBC AUTO: 97 FL (ref 82–98)
MCV RBC AUTO: 99 FL (ref 82–98)
METHADONE UR QL SCN>300 NG/ML: NEGATIVE
MONOCYTES # BLD AUTO: 0.5 K/UL (ref 0.3–1)
MONOCYTES # BLD AUTO: 0.7 K/UL (ref 0.3–1)
MONOCYTES NFR BLD: 7.8 % (ref 4–15)
MONOCYTES NFR BLD: 9 % (ref 4–15)
NEUTROPHILS # BLD AUTO: 3.1 K/UL (ref 1.8–7.7)
NEUTROPHILS # BLD AUTO: 5.7 K/UL (ref 1.8–7.7)
NEUTROPHILS NFR BLD: 55.9 % (ref 38–73)
NEUTROPHILS NFR BLD: 60.2 % (ref 38–73)
NRBC BLD-RTO: 0 /100 WBC
NRBC BLD-RTO: 0 /100 WBC
OPIATES UR QL SCN: NEGATIVE
PCO2 BLDA: 74.2 MMHG (ref 35–45)
PCP UR QL SCN>25 NG/ML: NEGATIVE
PH SMN: 7.3 [PH] (ref 7.35–7.45)
PLATELET # BLD AUTO: 166 K/UL (ref 150–350)
PLATELET # BLD AUTO: 219 K/UL (ref 150–350)
PLATELET BLD QL SMEAR: ABNORMAL
PMV BLD AUTO: 10.6 FL (ref 9.2–12.9)
PMV BLD AUTO: 11.3 FL (ref 9.2–12.9)
PO2 BLDA: 184 MMHG (ref 80–100)
POC BE: 10 MMOL/L
POC SATURATED O2: 99 % (ref 95–100)
POC TCO2: 39 MMOL/L (ref 23–27)
POTASSIUM SERPL-SCNC: 3.7 MMOL/L (ref 3.5–5.1)
POTASSIUM SERPL-SCNC: 4.4 MMOL/L (ref 3.5–5.1)
PROT SERPL-MCNC: 7.3 G/DL (ref 6–8.4)
PROT SERPL-MCNC: 7.4 G/DL (ref 6–8.4)
RBC # BLD AUTO: 4.05 M/UL (ref 4–5.4)
RBC # BLD AUTO: 4.18 M/UL (ref 4–5.4)
SAMPLE: ABNORMAL
SARS-COV-2 RDRP RESP QL NAA+PROBE: NEGATIVE
SITE: ABNORMAL
SODIUM SERPL-SCNC: 140 MMOL/L (ref 136–145)
SODIUM SERPL-SCNC: 142 MMOL/L (ref 136–145)
T3FREE SERPL-MCNC: 2.4 PG/ML (ref 2.3–4.2)
T4 FREE SERPL-MCNC: 1.04 NG/DL (ref 0.71–1.51)
TOXICOLOGY INFORMATION: NORMAL
TROPONIN I SERPL DL<=0.01 NG/ML-MCNC: <0.01 NG/ML (ref 0.02–0.5)
TSH SERPL DL<=0.005 MIU/L-ACNC: 1.15 UIU/ML (ref 0.34–5.6)
WBC # BLD AUTO: 5.54 K/UL (ref 3.9–12.7)
WBC # BLD AUTO: 9.5 K/UL (ref 3.9–12.7)

## 2020-01-01 PROCEDURE — 27000221 HC OXYGEN, UP TO 24 HOURS

## 2020-01-01 PROCEDURE — 99285 EMERGENCY DEPT VISIT HI MDM: CPT | Mod: 25

## 2020-01-01 PROCEDURE — 80053 COMPREHEN METABOLIC PANEL: CPT

## 2020-01-01 PROCEDURE — 71045 X-RAY EXAM CHEST 1 VIEW: CPT | Mod: TC,FY

## 2020-01-01 PROCEDURE — 25000242 PHARM REV CODE 250 ALT 637 W/ HCPCS: Performed by: EMERGENCY MEDICINE

## 2020-01-01 PROCEDURE — 71045 X-RAY EXAM CHEST 1 VIEW: CPT | Mod: 26,,, | Performed by: RADIOLOGY

## 2020-01-01 PROCEDURE — G0378 HOSPITAL OBSERVATION PER HR: HCPCS

## 2020-01-01 PROCEDURE — 36600 WITHDRAWAL OF ARTERIAL BLOOD: CPT

## 2020-01-01 PROCEDURE — 84439 ASSAY OF FREE THYROXINE: CPT

## 2020-01-01 PROCEDURE — 99900035 HC TECH TIME PER 15 MIN (STAT)

## 2020-01-01 PROCEDURE — U0002 COVID-19 LAB TEST NON-CDC: HCPCS

## 2020-01-01 PROCEDURE — 36415 COLL VENOUS BLD VENIPUNCTURE: CPT

## 2020-01-01 PROCEDURE — 71045 XR CHEST AP PORTABLE: ICD-10-PCS | Mod: 26,,, | Performed by: RADIOLOGY

## 2020-01-01 PROCEDURE — 84481 FREE ASSAY (FT-3): CPT

## 2020-01-01 PROCEDURE — 93005 ELECTROCARDIOGRAM TRACING: CPT

## 2020-01-01 PROCEDURE — 94640 AIRWAY INHALATION TREATMENT: CPT

## 2020-01-01 PROCEDURE — 85025 COMPLETE CBC W/AUTO DIFF WBC: CPT

## 2020-01-01 PROCEDURE — 25000003 PHARM REV CODE 250: Performed by: EMERGENCY MEDICINE

## 2020-01-01 PROCEDURE — 83880 ASSAY OF NATRIURETIC PEPTIDE: CPT

## 2020-01-01 PROCEDURE — 84484 ASSAY OF TROPONIN QUANT: CPT

## 2020-01-01 PROCEDURE — 63600175 PHARM REV CODE 636 W HCPCS: Performed by: EMERGENCY MEDICINE

## 2020-01-01 PROCEDURE — 94761 N-INVAS EAR/PLS OXIMETRY MLT: CPT

## 2020-01-01 PROCEDURE — 96374 THER/PROPH/DIAG INJ IV PUSH: CPT

## 2020-01-01 PROCEDURE — 63600175 PHARM REV CODE 636 W HCPCS: Performed by: FAMILY MEDICINE

## 2020-01-01 PROCEDURE — 80307 DRUG TEST PRSMV CHEM ANLYZR: CPT

## 2020-01-01 PROCEDURE — 84443 ASSAY THYROID STIM HORMONE: CPT

## 2020-01-01 RX ORDER — PREDNISONE 10 MG/1
40 TABLET ORAL DAILY
Status: DISCONTINUED | OUTPATIENT
Start: 2020-01-01 | End: 2020-01-01 | Stop reason: HOSPADM

## 2020-01-01 RX ORDER — PREDNISONE 20 MG/1
40 TABLET ORAL DAILY
Qty: 10 TABLET | Refills: 0 | Status: SHIPPED | OUTPATIENT
Start: 2020-01-01

## 2020-01-01 RX ORDER — METHYLPREDNISOLONE SOD SUCC 125 MG
125 VIAL (EA) INJECTION
Status: COMPLETED | OUTPATIENT
Start: 2020-01-01 | End: 2020-01-01

## 2020-01-01 RX ORDER — ASPIRIN 325 MG
325 TABLET ORAL
Status: COMPLETED | OUTPATIENT
Start: 2020-01-01 | End: 2020-01-01

## 2020-01-01 RX ORDER — IPRATROPIUM BROMIDE AND ALBUTEROL SULFATE 2.5; .5 MG/3ML; MG/3ML
3 SOLUTION RESPIRATORY (INHALATION)
Status: COMPLETED | OUTPATIENT
Start: 2020-01-01 | End: 2020-01-01

## 2020-01-01 RX ORDER — IPRATROPIUM BROMIDE AND ALBUTEROL SULFATE 2.5; .5 MG/3ML; MG/3ML
3 SOLUTION RESPIRATORY (INHALATION)
Status: DISCONTINUED | OUTPATIENT
Start: 2020-01-01 | End: 2020-01-01 | Stop reason: HOSPADM

## 2020-01-01 RX ADMIN — IPRATROPIUM BROMIDE AND ALBUTEROL SULFATE 3 ML: 2.5; .5 SOLUTION RESPIRATORY (INHALATION) at 02:10

## 2020-01-01 RX ADMIN — IPRATROPIUM BROMIDE AND ALBUTEROL SULFATE 3 ML: .5; 2.5 SOLUTION RESPIRATORY (INHALATION) at 09:10

## 2020-01-01 RX ADMIN — PREDNISONE 40 MG: 10 TABLET ORAL at 11:10

## 2020-01-01 RX ADMIN — ASPIRIN 325 MG ORAL TABLET 325 MG: 325 PILL ORAL at 02:10

## 2020-01-01 RX ADMIN — METHYLPREDNISOLONE SODIUM SUCCINATE 125 MG: 125 INJECTION, POWDER, FOR SOLUTION INTRAMUSCULAR; INTRAVENOUS at 02:10

## 2020-01-01 SDOH — SOCIAL DETERMINANTS OF HEALTH (SDOH): INADEQUATE HOUSING UTILITIES: Z59.12

## 2020-10-29 PROBLEM — J44.1 COPD WITH ACUTE EXACERBATION: Status: ACTIVE | Noted: 2020-01-01
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
## 2020-10-29 NOTE — PLAN OF CARE
Problem: Adult Inpatient Plan of Care  Goal: Plan of Care Review  10/29/2020 1433 by Nati Mcfarland RN  Outcome: Met  10/29/2020 0922 by Nati Mcfarland RN  Outcome: Ongoing, Progressing  Goal: Patient-Specific Goal (Individualization)  10/29/2020 1433 by Nati Mcfarland RN  Outcome: Met  10/29/2020 0922 by Nati Mcfarland RN  Outcome: Ongoing, Progressing  Goal: Absence of Hospital-Acquired Illness or Injury  10/29/2020 1433 by Nati Mcfarland RN  Outcome: Met  10/29/2020 0922 by Nati Mcfarland RN  Outcome: Ongoing, Progressing  Goal: Optimal Comfort and Wellbeing  10/29/2020 1433 by Nati Mcfarland RN  Outcome: Met  10/29/2020 0922 by Nati Mcfarland RN  Outcome: Ongoing, Progressing  Goal: Readiness for Transition of Care  10/29/2020 1433 by Nati Mcfarland RN  Outcome: Met  10/29/2020 0922 by Nati Mcfarland RN  Outcome: Ongoing, Progressing  Goal: Rounds/Family Conference  10/29/2020 1433 by Nati Mcfarland RN  Outcome: Met  10/29/2020 0922 by Nati Mcfarland RN  Outcome: Ongoing, Progressing     Problem: Fall Injury Risk  Goal: Absence of Fall and Fall-Related Injury  10/29/2020 1433 by Nati Mcfarland RN  Outcome: Met  10/29/2020 0922 by Nati Mcfarland RN  Outcome: Ongoing, Progressing

## 2020-10-29 NOTE — NURSING
1400 PT DC HOME AS PER DR BRICE. DC INSTRUCTIONS, ON MEDICATION, DIET, ACTIVITY, AND FOLLOW UP CARE GIVEN TO PT.PT VERBALIZES UNDERSTANDING. HL DC WITH JELCO INTACT. PT SENT HOME WITH O2 TANK. PT INSTRUCTED TO BRING O2 TANK BACK TO THE HOSPITAL. PT VERBALIZES UNDERSTANDING.NO SIGNS OF RESP. DISTRESS. PT ON T 30 ST . SAT 98% O22L.  PT TO CAR PER W/C WITH DC INSTRUCTIONS IN HAND. PT TOLERATED WELL. NO C/O PAIN. NO SIGNS OF ACUTE DISTRESS. ANA LILIA BELLA RN
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
## 2020-10-29 NOTE — SUBJECTIVE & OBJECTIVE
Past Medical History:   Diagnosis Date    Anxiety     COPD (chronic obstructive pulmonary disease)     Coronary artery disease     Hypertension        Past Surgical History:   Procedure Laterality Date    APPENDECTOMY      CARPAL TUNNEL RELEASE Left      SECTION      CHOLECYSTECTOMY      CORONARY ANGIOPLASTY WITH STENT PLACEMENT      HYSTERECTOMY         Review of patient's allergies indicates:   Allergen Reactions    Codeine     Pcn [penicillins]        No current facility-administered medications on file prior to encounter.      Current Outpatient Medications on File Prior to Encounter   Medication Sig    albuterol-ipratropium (DUO-NEB) 2.5 mg-0.5 mg/3 mL nebulizer solution Take 3 mLs by nebulization every 4 (four) hours as needed for Wheezing or Shortness of Breath. Rescue    alprazolam (XANAX) 0.5 MG tablet Take 0.25 mg by mouth daily as needed for Anxiety.    fluticasone furoate-vilanterol (BREO) 100-25 mcg/dose diskus inhaler Inhale 1 puff into the lungs once daily. Controller    ondansetron (ZOFRAN-ODT) 4 MG TbDL Take 1 tablet (4 mg total) by mouth every 6 (six) hours as needed.    pantoprazole (PROTONIX) 40 MG tablet Take 1 tablet (40 mg total) by mouth once daily.     Family History     None        Tobacco Use    Smoking status: Former Smoker   Substance and Sexual Activity    Alcohol use: No    Drug use: No    Sexual activity: Never     Review of Systems   Constitutional: Negative for fatigue and fever.   HENT: Negative.    Eyes: Negative for photophobia and visual disturbance.   Respiratory: Positive for chest tightness, shortness of breath and wheezing.    Cardiovascular: Negative for chest pain, palpitations and leg swelling.   Gastrointestinal: Negative.    Endocrine: Negative.    Genitourinary: Negative.    Musculoskeletal: Negative.    Skin: Negative.    Allergic/Immunologic: Negative.    Hematological: Negative.    Psychiatric/Behavioral: Negative.      Objective:      Vital Signs (Most Recent):  Temp: 97.2 °F (36.2 °C) (10/29/20 1125)  Pulse: 98 (10/29/20 1300)  Resp: 18 (10/29/20 1125)  BP: 133/60 (10/29/20 1125)  SpO2: 99 % (10/29/20 1300) Vital Signs (24h Range):  Temp:  [96 °F (35.6 °C)-98.3 °F (36.8 °C)] 97.2 °F (36.2 °C)  Pulse:  [] 98  Resp:  [18-25] 18  SpO2:  [95 %-100 %] 99 %  BP: (117-161)/(58-72) 133/60     Weight: 74.8 kg (164 lb 12.8 oz)  Body mass index is 30.14 kg/m².    Physical Exam  Vitals signs reviewed.   Constitutional:       Appearance: Normal appearance. She is not ill-appearing or toxic-appearing.   HENT:      Head: Normocephalic and atraumatic.      Right Ear: External ear normal.      Left Ear: External ear normal.      Nose: Nose normal.      Mouth/Throat:      Mouth: Mucous membranes are moist.   Eyes:      Conjunctiva/sclera: Conjunctivae normal.   Cardiovascular:      Rate and Rhythm: Normal rate and regular rhythm.      Pulses: Normal pulses.      Heart sounds: No murmur. No gallop.    Pulmonary:      Comments: Mildly increased work of breathing, scattered end expiratory wheezes, moderate air movement, no rales  Abdominal:      General: Bowel sounds are normal. There is no distension.      Tenderness: There is no abdominal tenderness.   Musculoskeletal:      Right lower leg: No edema.      Left lower leg: No edema.   Skin:     General: Skin is warm and dry.   Neurological:      Mental Status: She is alert and oriented to person, place, and time. Mental status is at baseline.   Psychiatric:         Mood and Affect: Mood normal.         Behavior: Behavior normal.             Significant Labs:   BMP:   Recent Labs   Lab 10/29/20  0222   *      K 4.4      CO2 31*   BUN 10   CREATININE 0.8   CALCIUM 9.1     CBC:   Recent Labs   Lab 10/29/20  0222   WBC 9.50   HGB 12.4   HCT 41.3        CMP:   Recent Labs   Lab 10/29/20  0222      K 4.4      CO2 31*   *   BUN 10   CREATININE 0.8   CALCIUM 9.1   PROT  7.4   ALBUMIN 3.9   BILITOT 0.5   ALKPHOS 67   AST 23   ALT 18   ANIONGAP 8   EGFRNONAA >60.0     All pertinent labs within the past 24 hours have been reviewed.    Significant Imaging: I have reviewed all pertinent imaging results/findings within the past 24 hours.

## 2020-10-29 NOTE — ASSESSMENT & PLAN NOTE
Admitted and started on PO steroids  Home oxygen acquired and secured  Patient returned to baseline oxygen saturation levels once placed on home oxygen requirements  duonebs q4h prn  No indication for antibiotics at this time
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
## 2020-11-13 NOTE — LETTER
November 13, 2020    Edmond Asif  [address] [address] [address]  Phone: [phone]  Fax: [phone]             Dear Edmond Asif:    Patient: Odessa Cm   MR Number: [id number]   YOB: 1960     Thank you for the referral of Odessa Cm to our lung transplant program. Your patients demographic and   clinical information have been submitted for transplant provider review and financial clearance. Once the provider and insurance company has approved the consult for your patient, she will be contacted by our office re: the date for an appointment. We will update you once this initial appointment has been scheduled. You will receive an after-visit summary following the completion of your patients appointment in our clinic.    Thank you again for your trust in our program. If there is anything we can do for you or your staff, please feel free to contact us at [phone].    Sincerely,         David Weill, MD  Medical Director, Lung Transplant  Pulmonary & Critical Care Medicine    [facility]  [address] [place] [id number]  [phone]

## 2020-11-13 NOTE — LETTER
December 7, 2020    Edmond Asif  [address] [address] [address]  Phone: [phone]  Fax: [phone]             Dear Edmond Asif:    Patient: Odessa Cm   MR Number: [id number]   YOB: 1960     Thank you for the referral of Odessa Cm to our lung transplant program. An initial appointment with the transplant team has been scheduled for January 12, 2021, at patient's request. You will receive an after-visit summary following the completion of your patients appointment in our clinic.    Thank you again for your trust in our program. If there is anything we can do for you or your staff, please feel free to contact us at [phone].    Sincerely,         David Weill, MD  Medical Director, Lung Transplant  Pulmonary & Critical Care Medicine    [facility]  [address] [place] [id number]  [phone]

## 2020-11-14 NOTE — TELEPHONE ENCOUNTER
Referral received from Dr. Asif, records reviewed, routed to Dr. Weill for review, and authorization to proceed with lung transplant consult visit and financial clearance.

## 2020-11-16 NOTE — TELEPHONE ENCOUNTER
"Received authorization to proceed with request for lung transplant outpatient consult and financial clearance. Routed to , request for authorization for PFTs, 6MWT, covid per protocol. Records routed to  for their use in obtaining authorization.      From: David Weill [email or web address]>   Sent: Saturday, November 14, 2020 9:36 AM  To: Sima Negrete [email or web address]>  Cc: Franca Bailey <[email or web address]>  Subject: Re: BRICE JEAN [[id number]]    Yes  David Weill, M.D.        On Nov 13, 2020, at 18:26, Sima Negrete <[email or web address]> wrote:  ?   BRICE JEAN [[id number]]     Referral from Dr. Asif     Dx: COPD  Patient is 59 year old female.  BMI 29.25     PFT's 2017  FVC 1.26  FEV1 0.63  TLC 6.28  DLCO not reported     Oxygen supplementation: yes 2 L/min     PET 2016 negative (solitary / stable pulmonary nodule, currently stable on CT)  Echo July 2020 'did not show RV enlargement' - have requested complete reports (heart cath, echo, PET, CT; no PFTs since  2017 'patient unable') no 6MWT noted/received.     "may be candidate for LVRS"     Consult?       "

## 2020-11-18 NOTE — TELEPHONE ENCOUNTER
Contacted patient about lung transplant referral, consult scheduling. Patient is interested, but reports she was unaware of referral. She requests to be given dates she can review with her daughter. Offered 12/1, 12/3, 12/8, 12/10, 12/15, 12/17. Patient verbalized understanding and reports she has phone number. Patient is aware we will confirm appointment date and time, she will need to obtain 6MWT, PFTs and covid testing with consult. Patient is aware that she will need to aim for a goal walk distance of 600 ft prior to being a candidate for lung transplant eval. She reports she knows she will need to enroll in pulmonary rehab and strengthen, that she has discussed this with her local doctors. She reports she has not completed PFTs since 2017 because she is unable to breath that long without profound shortness of breath/coughing.   Patient reports she is very deconditioned and gets extremely short of breath with any activity, that she uses oxygen 24/7 and does increase it with activity. Patient reports that she will have to be sure she has enough oxygen for travel, that she is having trouble having enough oxygen.   Despite this, patient reports she does want to come for lung transplant consultation.  Patient verbalized understanding, she is aware will need to wear a mask at all times, bring oxygen for travel, and states her daughter will accompany her. All questions answered at this time. Patient reports she will call back to confirm date of scheduling.

## 2020-11-18 NOTE — TELEPHONE ENCOUNTER
Attempted to reach patient, left voicemail on patient and other phone number on file. Phone number to transplant center, coordinator name provided on voicemail. Plan: patient will need to bring CD of CT, have PFTs, 6MWT, covid with consult.

## 2020-12-02 NOTE — TELEPHONE ENCOUNTER
Follow up with Neurology, consider Plavix for 1 year then change to aspirin.   Spoke with patient, she is requesting to wait until after the holidays to come for consultation visit. Offered 1/5, 1/7, 1/10, 1/12, 1/14. Patient requested 1/12, and is aware she will be scheduled for covid testing on 1/9 at [facility]. Patient reports she will obtain CD of CT imaging completed locally. Request to .

## 2020-12-02 NOTE — TELEPHONE ENCOUNTER
Contacted patient to follow up scheduling, has patient selected a date for consult, or would she prefer another date. Left voicemail requesting return call.

## 2021-01-01 ENCOUNTER — TELEPHONE (OUTPATIENT)
Dept: TRANSPLANT | Facility: CLINIC | Age: 61
End: 2021-01-01

## 2021-01-01 ENCOUNTER — HOSPITAL ENCOUNTER (EMERGENCY)
Facility: HOSPITAL | Age: 61
Discharge: ANOTHER HOSPITAL | End: 2021-01-08
Attending: FAMILY MEDICINE | Admitting: HOSPITALIST
Payer: MEDICARE

## 2021-01-01 ENCOUNTER — POST MORTEM DOCUMENTATION (OUTPATIENT)
Dept: TRANSPLANT | Facility: CLINIC | Age: 61
End: 2021-01-01

## 2021-01-01 VITALS
SYSTOLIC BLOOD PRESSURE: 131 MMHG | TEMPERATURE: 99 F | BODY MASS INDEX: 30.18 KG/M2 | OXYGEN SATURATION: 97 % | DIASTOLIC BLOOD PRESSURE: 68 MMHG | HEART RATE: 112 BPM | WEIGHT: 165 LBS | RESPIRATION RATE: 17 BRPM

## 2021-01-01 DIAGNOSIS — U07.1 COVID-19 VIRUS INFECTION: ICD-10-CM

## 2021-01-01 DIAGNOSIS — J44.1 COPD EXACERBATION: Primary | ICD-10-CM

## 2021-01-01 DIAGNOSIS — I47.10 SVT (SUPRAVENTRICULAR TACHYCARDIA): ICD-10-CM

## 2021-01-01 DIAGNOSIS — R91.8 LEFT PULMONARY INFILTRATE ON CXR: ICD-10-CM

## 2021-01-01 DIAGNOSIS — U07.1 COVID-19 VIRUS DETECTED: ICD-10-CM

## 2021-01-01 DIAGNOSIS — R06.02 SOB (SHORTNESS OF BREATH): ICD-10-CM

## 2021-01-01 LAB
ALBUMIN SERPL BCP-MCNC: 3.2 G/DL (ref 3.5–5.2)
ALLENS TEST: ABNORMAL
ALP SERPL-CCNC: 62 U/L (ref 55–135)
ALT SERPL W/O P-5'-P-CCNC: 13 U/L (ref 10–44)
ANION GAP SERPL CALC-SCNC: 11 MMOL/L (ref 8–16)
AST SERPL-CCNC: 20 U/L (ref 10–40)
BASOPHILS # BLD AUTO: 0.05 K/UL (ref 0–0.2)
BASOPHILS NFR BLD: 0.4 % (ref 0–1.9)
BILIRUB SERPL-MCNC: 0.8 MG/DL (ref 0.1–1)
BUN SERPL-MCNC: 15 MG/DL (ref 6–20)
CALCIUM SERPL-MCNC: 8.8 MG/DL (ref 8.7–10.5)
CHLORIDE SERPL-SCNC: 102 MMOL/L (ref 95–110)
CO2 SERPL-SCNC: 31 MMOL/L (ref 23–29)
CREAT SERPL-MCNC: 0.8 MG/DL (ref 0.5–1.4)
DELSYS: ABNORMAL
DIFFERENTIAL METHOD: ABNORMAL
EOSINOPHIL # BLD AUTO: 0.1 K/UL (ref 0–0.5)
EOSINOPHIL NFR BLD: 0.8 % (ref 0–8)
ERYTHROCYTE [DISTWIDTH] IN BLOOD BY AUTOMATED COUNT: 13.1 % (ref 11.5–14.5)
EST. GFR  (AFRICAN AMERICAN): >60 ML/MIN/1.73 M^2
EST. GFR  (NON AFRICAN AMERICAN): >60 ML/MIN/1.73 M^2
GLUCOSE SERPL-MCNC: 229 MG/DL (ref 70–110)
HCO3 UR-SCNC: 35.4 MMOL/L (ref 24–28)
HCT VFR BLD AUTO: 38 % (ref 37–48.5)
HGB BLD-MCNC: 11 G/DL (ref 12–16)
IMM GRANULOCYTES # BLD AUTO: 0.05 K/UL (ref 0–0.04)
IMM GRANULOCYTES NFR BLD AUTO: 0.4 % (ref 0–0.5)
LACTATE SERPL-SCNC: 4.2 MMOL/L (ref 0.5–2.2)
LYMPHOCYTES # BLD AUTO: 3.3 K/UL (ref 1–4.8)
LYMPHOCYTES NFR BLD: 24.9 % (ref 18–48)
MAGNESIUM SERPL-MCNC: 2 MG/DL (ref 1.6–2.6)
MCH RBC QN AUTO: 29.6 PG (ref 27–31)
MCHC RBC AUTO-ENTMCNC: 28.9 G/DL (ref 32–36)
MCV RBC AUTO: 102 FL (ref 82–98)
MONOCYTES # BLD AUTO: 1.4 K/UL (ref 0.3–1)
MONOCYTES NFR BLD: 10.1 % (ref 4–15)
NEUTROPHILS # BLD AUTO: 8.5 K/UL (ref 1.8–7.7)
NEUTROPHILS NFR BLD: 63.4 % (ref 38–73)
NRBC BLD-RTO: 0 /100 WBC
PCO2 BLDA: 54.5 MMHG (ref 35–45)
PH SMN: 7.42 [PH] (ref 7.35–7.45)
PLATELET # BLD AUTO: 321 K/UL (ref 150–350)
PMV BLD AUTO: 11.6 FL (ref 9.2–12.9)
PO2 BLDA: 142 MMHG (ref 80–100)
POC BE: 11 MMOL/L
POC SATURATED O2: 99 % (ref 95–100)
POC TCO2: 37 MMOL/L (ref 23–27)
POTASSIUM SERPL-SCNC: 3.7 MMOL/L (ref 3.5–5.1)
PROT SERPL-MCNC: 7.5 G/DL (ref 6–8.4)
RBC # BLD AUTO: 3.71 M/UL (ref 4–5.4)
SAMPLE: ABNORMAL
SARS-COV-2 RDRP RESP QL NAA+PROBE: POSITIVE
SITE: ABNORMAL
SODIUM SERPL-SCNC: 144 MMOL/L (ref 136–145)
TROPONIN I SERPL DL<=0.01 NG/ML-MCNC: 0.01 NG/ML (ref 0.02–0.5)
TSH SERPL DL<=0.005 MIU/L-ACNC: 1 UIU/ML (ref 0.34–5.6)
WBC # BLD AUTO: 13.4 K/UL (ref 3.9–12.7)

## 2021-01-01 PROCEDURE — 25000003 PHARM REV CODE 250: Mod: NTX | Performed by: FAMILY MEDICINE

## 2021-01-01 PROCEDURE — 94660 CPAP INITIATION&MGMT: CPT | Mod: NTX

## 2021-01-01 PROCEDURE — 96365 THER/PROPH/DIAG IV INF INIT: CPT | Mod: NTX

## 2021-01-01 PROCEDURE — U0002 COVID-19 LAB TEST NON-CDC: HCPCS | Mod: NTX

## 2021-01-01 PROCEDURE — 85025 COMPLETE CBC W/AUTO DIFF WBC: CPT | Mod: NTX

## 2021-01-01 PROCEDURE — 84484 ASSAY OF TROPONIN QUANT: CPT | Mod: NTX

## 2021-01-01 PROCEDURE — 25000242 PHARM REV CODE 250 ALT 637 W/ HCPCS: Mod: NTX | Performed by: FAMILY MEDICINE

## 2021-01-01 PROCEDURE — 71045 X-RAY EXAM CHEST 1 VIEW: CPT | Mod: 26,NTX,, | Performed by: RADIOLOGY

## 2021-01-01 PROCEDURE — 80053 COMPREHEN METABOLIC PANEL: CPT | Mod: NTX

## 2021-01-01 PROCEDURE — 94640 AIRWAY INHALATION TREATMENT: CPT | Mod: NTX

## 2021-01-01 PROCEDURE — 96376 TX/PRO/DX INJ SAME DRUG ADON: CPT | Mod: NTX

## 2021-01-01 PROCEDURE — 87040 BLOOD CULTURE FOR BACTERIA: CPT | Mod: 59,NTX

## 2021-01-01 PROCEDURE — 63600175 PHARM REV CODE 636 W HCPCS: Mod: NTX | Performed by: FAMILY MEDICINE

## 2021-01-01 PROCEDURE — 25000242 PHARM REV CODE 250 ALT 637 W/ HCPCS: Mod: NTX

## 2021-01-01 PROCEDURE — 71045 X-RAY EXAM CHEST 1 VIEW: CPT | Mod: TC,FY,NTX

## 2021-01-01 PROCEDURE — 96366 THER/PROPH/DIAG IV INF ADDON: CPT | Mod: NTX

## 2021-01-01 PROCEDURE — 83605 ASSAY OF LACTIC ACID: CPT | Mod: NTX

## 2021-01-01 PROCEDURE — 84443 ASSAY THYROID STIM HORMONE: CPT | Mod: NTX

## 2021-01-01 PROCEDURE — 83735 ASSAY OF MAGNESIUM: CPT | Mod: NTX

## 2021-01-01 PROCEDURE — 63600175 PHARM REV CODE 636 W HCPCS: Mod: NTX

## 2021-01-01 PROCEDURE — 96375 TX/PRO/DX INJ NEW DRUG ADDON: CPT | Mod: NTX

## 2021-01-01 PROCEDURE — 36415 COLL VENOUS BLD VENIPUNCTURE: CPT | Mod: NTX

## 2021-01-01 PROCEDURE — 36600 WITHDRAWAL OF ARTERIAL BLOOD: CPT | Mod: NTX

## 2021-01-01 PROCEDURE — 99285 EMERGENCY DEPT VISIT HI MDM: CPT | Mod: 25,NTX

## 2021-01-01 PROCEDURE — 71045 XR CHEST AP PORTABLE: ICD-10-PCS | Mod: 26,NTX,, | Performed by: RADIOLOGY

## 2021-01-01 PROCEDURE — 94761 N-INVAS EAR/PLS OXIMETRY MLT: CPT | Mod: NTX

## 2021-01-01 PROCEDURE — 27000190 HC CPAP FULL FACE MASK W/VALVE: Mod: NTX

## 2021-01-01 PROCEDURE — 99900035 HC TECH TIME PER 15 MIN (STAT): Mod: NTX

## 2021-01-01 PROCEDURE — 96368 THER/DIAG CONCURRENT INF: CPT | Mod: NTX

## 2021-01-01 RX ORDER — DILTIAZEM HYDROCHLORIDE 5 MG/ML
15 INJECTION INTRAVENOUS
Status: COMPLETED | OUTPATIENT
Start: 2021-01-01 | End: 2021-01-01

## 2021-01-01 RX ORDER — LEVALBUTEROL INHALATION SOLUTION 1.25 MG/3ML
1.25 SOLUTION RESPIRATORY (INHALATION)
Status: DISCONTINUED | OUTPATIENT
Start: 2021-01-01 | End: 2021-01-01 | Stop reason: HOSPADM

## 2021-01-01 RX ORDER — IPRATROPIUM BROMIDE AND ALBUTEROL SULFATE 2.5; .5 MG/3ML; MG/3ML
3 SOLUTION RESPIRATORY (INHALATION)
Status: COMPLETED | OUTPATIENT
Start: 2021-01-01 | End: 2021-01-01

## 2021-01-01 RX ORDER — DILTIAZEM HCL 1 MG/ML
INJECTION, SOLUTION INTRAVENOUS
Status: COMPLETED
Start: 2021-01-01 | End: 2021-01-01

## 2021-01-01 RX ORDER — LEVALBUTEROL 1.25 MG/.5ML
SOLUTION, CONCENTRATE RESPIRATORY (INHALATION)
Status: COMPLETED
Start: 2021-01-01 | End: 2021-01-01

## 2021-01-01 RX ORDER — DILTIAZEM HCL 1 MG/ML
5 INJECTION, SOLUTION INTRAVENOUS
Status: COMPLETED | OUTPATIENT
Start: 2021-01-01 | End: 2021-01-01

## 2021-01-01 RX ORDER — DEXAMETHASONE SODIUM PHOSPHATE 100 MG/10ML
6 INJECTION INTRAMUSCULAR; INTRAVENOUS
Status: COMPLETED | OUTPATIENT
Start: 2021-01-01 | End: 2021-01-01

## 2021-01-01 RX ORDER — ADENOSINE 3 MG/ML
6 INJECTION, SOLUTION INTRAVENOUS
Status: COMPLETED | OUTPATIENT
Start: 2021-01-01 | End: 2021-01-01

## 2021-01-01 RX ORDER — LORAZEPAM 2 MG/ML
1 INJECTION INTRAMUSCULAR
Status: COMPLETED | OUTPATIENT
Start: 2021-01-01 | End: 2021-01-01

## 2021-01-01 RX ORDER — METHYLPREDNISOLONE SOD SUCC 125 MG
125 VIAL (EA) INJECTION
Status: COMPLETED | OUTPATIENT
Start: 2021-01-01 | End: 2021-01-01

## 2021-01-01 RX ORDER — ADENOSINE 3 MG/ML
12 INJECTION, SOLUTION INTRAVENOUS
Status: COMPLETED | OUTPATIENT
Start: 2021-01-01 | End: 2021-01-01

## 2021-01-01 RX ORDER — DEXAMETHASONE SODIUM PHOSPHATE 10 MG/ML
INJECTION INTRAMUSCULAR; INTRAVENOUS
Status: DISCONTINUED
Start: 2021-01-01 | End: 2021-01-01 | Stop reason: HOSPADM

## 2021-01-01 RX ORDER — DILTIAZEM HYDROCHLORIDE 5 MG/ML
10 INJECTION INTRAVENOUS
Status: COMPLETED | OUTPATIENT
Start: 2021-01-01 | End: 2021-01-01

## 2021-01-01 RX ORDER — LEVOFLOXACIN 5 MG/ML
750 INJECTION, SOLUTION INTRAVENOUS
Status: COMPLETED | OUTPATIENT
Start: 2021-01-01 | End: 2021-01-01

## 2021-01-01 RX ORDER — LEVALBUTEROL INHALATION SOLUTION 1.25 MG/3ML
1.25 SOLUTION RESPIRATORY (INHALATION)
Status: COMPLETED | OUTPATIENT
Start: 2021-01-01 | End: 2021-01-01

## 2021-01-01 RX ORDER — ONDANSETRON 2 MG/ML
INJECTION INTRAMUSCULAR; INTRAVENOUS
Status: COMPLETED
Start: 2021-01-01 | End: 2021-01-01

## 2021-01-01 RX ORDER — ADENOSINE 3 MG/ML
INJECTION, SOLUTION INTRAVENOUS
Status: COMPLETED
Start: 2021-01-01 | End: 2021-01-01

## 2021-01-01 RX ADMIN — LEVALBUTEROL 1.25 MG: 1.25 SOLUTION RESPIRATORY (INHALATION) at 02:01

## 2021-01-01 RX ADMIN — ADENOSINE 12 MG: 3 INJECTION, SOLUTION INTRAVENOUS at 02:01

## 2021-01-01 RX ADMIN — DOXYCYCLINE 100 MG: 100 INJECTION, POWDER, LYOPHILIZED, FOR SOLUTION INTRAVENOUS at 10:01

## 2021-01-01 RX ADMIN — SODIUM CHLORIDE 1000 ML: 0.9 INJECTION, SOLUTION INTRAVENOUS at 10:01

## 2021-01-01 RX ADMIN — DILTIAZEM HYDROCHLORIDE 10 MG/HR: 5 INJECTION INTRAVENOUS at 02:01

## 2021-01-01 RX ADMIN — DILTIAZEM HYDROCHLORIDE 10 MG: 5 INJECTION INTRAVENOUS at 08:01

## 2021-01-01 RX ADMIN — DEXAMETHASONE SODIUM PHOSPHATE 6 MG: 10 INJECTION INTRAMUSCULAR; INTRAVENOUS at 10:01

## 2021-01-01 RX ADMIN — LORAZEPAM 1 MG: 2 INJECTION INTRAMUSCULAR; INTRAVENOUS at 08:01

## 2021-01-01 RX ADMIN — LORAZEPAM 1 MG: 2 INJECTION INTRAMUSCULAR; INTRAVENOUS at 02:01

## 2021-01-01 RX ADMIN — LEVOFLOXACIN 750 MG: 750 INJECTION, SOLUTION INTRAVENOUS at 08:01

## 2021-01-01 RX ADMIN — METHYLPREDNISOLONE SODIUM SUCCINATE 125 MG: 125 INJECTION, POWDER, FOR SOLUTION INTRAMUSCULAR; INTRAVENOUS at 07:01

## 2021-01-01 RX ADMIN — ADENOSINE 6 MG: 3 INJECTION, SOLUTION INTRAVENOUS at 02:01

## 2021-01-01 RX ADMIN — SODIUM CHLORIDE 1000 ML: 0.9 INJECTION, SOLUTION INTRAVENOUS at 08:01

## 2021-01-01 RX ADMIN — DILTIAZEM HYDROCHLORIDE 15 MG: 5 INJECTION INTRAVENOUS at 08:01

## 2021-01-01 RX ADMIN — Medication 5 MG/HR: at 08:01

## 2021-01-01 RX ADMIN — LEVALBUTEROL HYDROCHLORIDE 1.25 MG: 1.25 SOLUTION, CONCENTRATE RESPIRATORY (INHALATION) at 08:01

## 2021-01-01 RX ADMIN — ONDANSETRON HYDROCHLORIDE 4 MG: 2 SOLUTION INTRAMUSCULAR; INTRAVENOUS at 08:01

## 2021-01-01 RX ADMIN — IPRATROPIUM BROMIDE AND ALBUTEROL SULFATE 3 ML: .5; 3 SOLUTION RESPIRATORY (INHALATION) at 08:01

## 2021-01-14 LAB
BACTERIA BLD CULT: NORMAL
BACTERIA BLD CULT: NORMAL